# Patient Record
Sex: FEMALE | Race: WHITE | NOT HISPANIC OR LATINO | Employment: UNEMPLOYED | URBAN - METROPOLITAN AREA
[De-identification: names, ages, dates, MRNs, and addresses within clinical notes are randomized per-mention and may not be internally consistent; named-entity substitution may affect disease eponyms.]

---

## 2020-07-28 ENCOUNTER — TRANSCRIBE ORDERS (OUTPATIENT)
Dept: ADMINISTRATIVE | Facility: HOSPITAL | Age: 35
End: 2020-07-28

## 2020-07-28 DIAGNOSIS — I25.89 ISCHEMIC HEART DISEASE WITH CHRONOTROPIC INCOMPETENCE: Primary | ICD-10-CM

## 2020-07-28 DIAGNOSIS — I25.10 CHRONIC CORONARY ARTERY DISEASE: ICD-10-CM

## 2020-08-30 ENCOUNTER — HOSPITAL ENCOUNTER (OUTPATIENT)
Dept: CARDIOLOGY | Facility: HOSPITAL | Age: 35
Discharge: HOME | End: 2020-08-30
Admitting: ORTHOPAEDIC SURGERY

## 2020-08-30 VITALS — BODY MASS INDEX: 29.99 KG/M2 | HEIGHT: 65 IN | WEIGHT: 180 LBS

## 2020-08-30 DIAGNOSIS — I25.10 CHRONIC CORONARY ARTERY DISEASE: ICD-10-CM

## 2020-08-30 LAB
BH CV ECHO MEAS - AO MAX PG (FULL): 4.1 MMHG
BH CV ECHO MEAS - AO MAX PG: 9.6 MMHG
BH CV ECHO MEAS - AO MEAN PG (FULL): 1.7 MMHG
BH CV ECHO MEAS - AO MEAN PG: 5.1 MMHG
BH CV ECHO MEAS - AO ROOT AREA (BSA CORRECTED): 1.3
BH CV ECHO MEAS - AO ROOT AREA: 5 CM^2
BH CV ECHO MEAS - AO ROOT DIAM: 2.5 CM
BH CV ECHO MEAS - AO V2 MAX: 155.3 CM/SEC
BH CV ECHO MEAS - AO V2 MEAN: 103.3 CM/SEC
BH CV ECHO MEAS - AO V2 VTI: 35.3 CM
BH CV ECHO MEAS - AVA(I,A): 2.3 CM^2
BH CV ECHO MEAS - AVA(I,D): 2.3 CM^2
BH CV ECHO MEAS - AVA(V,A): 2.3 CM^2
BH CV ECHO MEAS - AVA(V,D): 2.3 CM^2
BH CV ECHO MEAS - BSA(HAYCOCK): 2 M^2
BH CV ECHO MEAS - BSA: 1.9 M^2
BH CV ECHO MEAS - BZI_BMI: 30 KILOGRAMS/M^2
BH CV ECHO MEAS - BZI_METRIC_HEIGHT: 165.1 CM
BH CV ECHO MEAS - BZI_METRIC_WEIGHT: 81.6 KG
BH CV ECHO MEAS - EDV(CUBED): 66.4 ML
BH CV ECHO MEAS - EDV(MOD-SP2): 90 ML
BH CV ECHO MEAS - EDV(MOD-SP4): 105 ML
BH CV ECHO MEAS - EDV(TEICH): 72 ML
BH CV ECHO MEAS - EF(CUBED): 67 %
BH CV ECHO MEAS - EF(MOD-BP): 62 %
BH CV ECHO MEAS - EF(MOD-SP2): 65.6 %
BH CV ECHO MEAS - EF(MOD-SP4): 64.8 %
BH CV ECHO MEAS - EF(TEICH): 59.1 %
BH CV ECHO MEAS - ESV(CUBED): 21.9 ML
BH CV ECHO MEAS - ESV(MOD-SP2): 31 ML
BH CV ECHO MEAS - ESV(MOD-SP4): 37 ML
BH CV ECHO MEAS - ESV(TEICH): 29.5 ML
BH CV ECHO MEAS - FS: 30.9 %
BH CV ECHO MEAS - IVS/LVPW: 0.59
BH CV ECHO MEAS - IVSD: 0.72 CM
BH CV ECHO MEAS - LA DIMENSION: 3.6 CM
BH CV ECHO MEAS - LA/AO: 1.4
BH CV ECHO MEAS - LAD MAJOR: 5.4 CM
BH CV ECHO MEAS - LAT PEAK E' VEL: 18 CM/SEC
BH CV ECHO MEAS - LATERAL E/E' RATIO: 5.7
BH CV ECHO MEAS - LV DIASTOLIC VOL/BSA (35-75): 55.5 ML/M^2
BH CV ECHO MEAS - LV MASS(C)D: 125.1 GRAMS
BH CV ECHO MEAS - LV MASS(C)DI: 66.1 GRAMS/M^2
BH CV ECHO MEAS - LV MAX PG: 5.6 MMHG
BH CV ECHO MEAS - LV MEAN PG: 3.4 MMHG
BH CV ECHO MEAS - LV SYSTOLIC VOL/BSA (12-30): 19.6 ML/M^2
BH CV ECHO MEAS - LV V1 MAX: 118 CM/SEC
BH CV ECHO MEAS - LV V1 MEAN: 87 CM/SEC
BH CV ECHO MEAS - LV V1 VTI: 26.7 CM
BH CV ECHO MEAS - LVIDD: 4 CM
BH CV ECHO MEAS - LVIDS: 2.8 CM
BH CV ECHO MEAS - LVLD AP2: 8 CM
BH CV ECHO MEAS - LVLD AP4: 8.3 CM
BH CV ECHO MEAS - LVLS AP2: 5.6 CM
BH CV ECHO MEAS - LVLS AP4: 6.6 CM
BH CV ECHO MEAS - LVOT AREA (M): 3.1 CM^2
BH CV ECHO MEAS - LVOT AREA: 3 CM^2
BH CV ECHO MEAS - LVOT DIAM: 2 CM
BH CV ECHO MEAS - LVPWD: 1.2 CM
BH CV ECHO MEAS - MED PEAK E' VEL: 12 CM/SEC
BH CV ECHO MEAS - MEDIAL E/E' RATIO: 8.6
BH CV ECHO MEAS - MV A MAX VEL: 77 CM/SEC
BH CV ECHO MEAS - MV DEC TIME: 0.19 SEC
BH CV ECHO MEAS - MV E MAX VEL: 104.6 CM/SEC
BH CV ECHO MEAS - MV E/A: 1.4
BH CV ECHO MEAS - MV MAX PG: 4.6 MMHG
BH CV ECHO MEAS - MV MEAN PG: 2.3 MMHG
BH CV ECHO MEAS - MV V2 MAX: 106.8 CM/SEC
BH CV ECHO MEAS - MV V2 MEAN: 70.9 CM/SEC
BH CV ECHO MEAS - MV V2 VTI: 29.2 CM
BH CV ECHO MEAS - MVA(VTI): 2.7 CM^2
BH CV ECHO MEAS - PA ACC SLOPE: 981.3 CM/SEC^2
BH CV ECHO MEAS - PA ACC TIME: 0.1 SEC
BH CV ECHO MEAS - PA PR(ACCEL): 36.2 MMHG
BH CV ECHO MEAS - RAP SYSTOLE: 3 MMHG
BH CV ECHO MEAS - RVSP: 19 MMHG
BH CV ECHO MEAS - SI(AO): 94 ML/M^2
BH CV ECHO MEAS - SI(CUBED): 23.5 ML/M^2
BH CV ECHO MEAS - SI(LVOT): 42.4 ML/M^2
BH CV ECHO MEAS - SI(MOD-SP2): 31.2 ML/M^2
BH CV ECHO MEAS - SI(MOD-SP4): 36 ML/M^2
BH CV ECHO MEAS - SI(TEICH): 22.5 ML/M^2
BH CV ECHO MEAS - SV(AO): 177.8 ML
BH CV ECHO MEAS - SV(CUBED): 44.5 ML
BH CV ECHO MEAS - SV(LVOT): 80.2 ML
BH CV ECHO MEAS - SV(MOD-SP2): 59 ML
BH CV ECHO MEAS - SV(MOD-SP4): 68 ML
BH CV ECHO MEAS - SV(TEICH): 42.5 ML
BH CV ECHO MEAS - TAPSE (>1.6): 2.3 CM2
BH CV ECHO MEAS - TR MAX PG: 16 MMHG
BH CV ECHO MEAS - TR MAX VEL: 200.9 CM/SEC
BH CV ECHO MEASUREMENTS AVERAGE E/E' RATIO: 6.97
BH CV VAS BP LEFT ARM: NORMAL MMHG
BH CV XLRA - RV BASE: 2.9 CM
BH CV XLRA - RV LENGTH: 6.3 CM
BH CV XLRA - RV MID: 1.9 CM
BH CV XLRA - TDI S': 12.8 CM/SEC
LEFT ATRIUM VOLUME INDEX: 18.5 ML/M^2
LEFT ATRIUM VOLUME: 35 ML
LV EF 2D ECHO EST: 65 %
MAXIMAL PREDICTED HEART RATE: 187 BPM
STRESS TARGET HR: 159 BPM

## 2020-08-30 PROCEDURE — 93306 TTE W/DOPPLER COMPLETE: CPT | Performed by: INTERNAL MEDICINE

## 2020-08-30 PROCEDURE — 93306 TTE W/DOPPLER COMPLETE: CPT

## 2021-03-18 ENCOUNTER — HOSPITAL ENCOUNTER (EMERGENCY)
Facility: HOSPITAL | Age: 36
Discharge: HOME | End: 2021-03-18
Attending: EMERGENCY MEDICINE | Admitting: EMERGENCY MEDICINE

## 2021-03-18 ENCOUNTER — APPOINTMENT (OUTPATIENT)
Dept: ULTRASOUND IMAGING | Facility: HOSPITAL | Age: 36
End: 2021-03-18

## 2021-03-18 VITALS
WEIGHT: 174.2 LBS | HEART RATE: 76 BPM | TEMPERATURE: 98.3 F | OXYGEN SATURATION: 99 % | DIASTOLIC BLOOD PRESSURE: 60 MMHG | BODY MASS INDEX: 29.02 KG/M2 | SYSTOLIC BLOOD PRESSURE: 122 MMHG | RESPIRATION RATE: 16 BRPM | HEIGHT: 65 IN

## 2021-03-18 DIAGNOSIS — R10.32 LLQ ABDOMINAL PAIN: ICD-10-CM

## 2021-03-18 DIAGNOSIS — Z34.91 FIRST TRIMESTER PREGNANCY: Primary | ICD-10-CM

## 2021-03-18 LAB
ABO GROUP BLD: NORMAL
ALBUMIN SERPL-MCNC: 3.9 G/DL (ref 3.5–5.2)
ALBUMIN/GLOB SERPL: 2 G/DL
ALP SERPL-CCNC: 59 U/L (ref 39–117)
ALT SERPL W P-5'-P-CCNC: 14 U/L (ref 1–33)
ANION GAP SERPL CALCULATED.3IONS-SCNC: 7.9 MMOL/L (ref 5–15)
AST SERPL-CCNC: 11 U/L (ref 1–32)
BASOPHILS # BLD AUTO: 0.07 10*3/MM3 (ref 0–0.2)
BASOPHILS NFR BLD AUTO: 0.8 % (ref 0–1.5)
BILIRUB SERPL-MCNC: 0.3 MG/DL (ref 0–1.2)
BUN SERPL-MCNC: 8 MG/DL (ref 6–20)
BUN/CREAT SERPL: 11.1 (ref 7–25)
CALCIUM SPEC-SCNC: 9.1 MG/DL (ref 8.6–10.5)
CHLORIDE SERPL-SCNC: 105 MMOL/L (ref 98–107)
CLUE CELLS SPEC QL WET PREP: ABNORMAL
CO2 SERPL-SCNC: 23.1 MMOL/L (ref 22–29)
CREAT SERPL-MCNC: 0.72 MG/DL (ref 0.57–1)
DEPRECATED RDW RBC AUTO: 43.2 FL (ref 37–54)
EOSINOPHIL # BLD AUTO: 0.21 10*3/MM3 (ref 0–0.4)
EOSINOPHIL NFR BLD AUTO: 2.5 % (ref 0.3–6.2)
ERYTHROCYTE [DISTWIDTH] IN BLOOD BY AUTOMATED COUNT: 13 % (ref 12.3–15.4)
GFR SERPL CREATININE-BSD FRML MDRD: 113 ML/MIN/1.73
GFR SERPL CREATININE-BSD FRML MDRD: 93 ML/MIN/1.73
GLOBULIN UR ELPH-MCNC: 2 GM/DL
GLUCOSE SERPL-MCNC: 80 MG/DL (ref 65–99)
HCG INTACT+B SERPL-ACNC: NORMAL MIU/ML
HCT VFR BLD AUTO: 42.6 % (ref 34–46.6)
HGB BLD-MCNC: 14.1 G/DL (ref 12–15.9)
HYDATID CYST SPEC WET PREP: ABNORMAL
IMM GRANULOCYTES # BLD AUTO: 0.03 10*3/MM3 (ref 0–0.05)
IMM GRANULOCYTES NFR BLD AUTO: 0.4 % (ref 0–0.5)
KOH PREP NAIL: NORMAL
LYMPHOCYTES # BLD AUTO: 2.24 10*3/MM3 (ref 0.7–3.1)
LYMPHOCYTES NFR BLD AUTO: 26.4 % (ref 19.6–45.3)
MCH RBC QN AUTO: 30 PG (ref 26.6–33)
MCHC RBC AUTO-ENTMCNC: 33.1 G/DL (ref 31.5–35.7)
MCV RBC AUTO: 90.6 FL (ref 79–97)
MONOCYTES # BLD AUTO: 0.48 10*3/MM3 (ref 0.1–0.9)
MONOCYTES NFR BLD AUTO: 5.6 % (ref 5–12)
NEUTROPHILS NFR BLD AUTO: 5.47 10*3/MM3 (ref 1.7–7)
NEUTROPHILS NFR BLD AUTO: 64.3 % (ref 42.7–76)
NRBC BLD AUTO-RTO: 0 /100 WBC (ref 0–0.2)
NUMBER OF DOSES: NORMAL
PLATELET # BLD AUTO: 295 10*3/MM3 (ref 140–450)
PMV BLD AUTO: 9.9 FL (ref 6–12)
POTASSIUM SERPL-SCNC: 4.3 MMOL/L (ref 3.5–5.2)
PROT SERPL-MCNC: 5.9 G/DL (ref 6–8.5)
RBC # BLD AUTO: 4.7 10*6/MM3 (ref 3.77–5.28)
RH BLD: POSITIVE
SODIUM SERPL-SCNC: 136 MMOL/L (ref 136–145)
T VAGINALIS SPEC QL WET PREP: ABNORMAL
WBC # BLD AUTO: 8.5 10*3/MM3 (ref 3.4–10.8)
WBC SPEC QL WET PREP: ABNORMAL
YEAST GENITAL QL WET PREP: ABNORMAL

## 2021-03-18 PROCEDURE — 87491 CHLMYD TRACH DNA AMP PROBE: CPT | Performed by: PHYSICIAN ASSISTANT

## 2021-03-18 PROCEDURE — 84702 CHORIONIC GONADOTROPIN TEST: CPT | Performed by: PHYSICIAN ASSISTANT

## 2021-03-18 PROCEDURE — 87220 TISSUE EXAM FOR FUNGI: CPT | Performed by: PHYSICIAN ASSISTANT

## 2021-03-18 PROCEDURE — 80053 COMPREHEN METABOLIC PANEL: CPT | Performed by: PHYSICIAN ASSISTANT

## 2021-03-18 PROCEDURE — 87210 SMEAR WET MOUNT SALINE/INK: CPT | Performed by: PHYSICIAN ASSISTANT

## 2021-03-18 PROCEDURE — 76817 TRANSVAGINAL US OBSTETRIC: CPT

## 2021-03-18 PROCEDURE — 99284 EMERGENCY DEPT VISIT MOD MDM: CPT

## 2021-03-18 PROCEDURE — 87591 N.GONORRHOEAE DNA AMP PROB: CPT | Performed by: PHYSICIAN ASSISTANT

## 2021-03-18 PROCEDURE — 85025 COMPLETE CBC W/AUTO DIFF WBC: CPT | Performed by: PHYSICIAN ASSISTANT

## 2021-03-18 PROCEDURE — 86900 BLOOD TYPING SEROLOGIC ABO: CPT | Performed by: PHYSICIAN ASSISTANT

## 2021-03-18 PROCEDURE — 99283 EMERGENCY DEPT VISIT LOW MDM: CPT

## 2021-03-18 PROCEDURE — 86901 BLOOD TYPING SEROLOGIC RH(D): CPT | Performed by: PHYSICIAN ASSISTANT

## 2021-03-18 RX ORDER — HYDROCODONE BITARTRATE AND ACETAMINOPHEN 5; 325 MG/1; MG/1
1 TABLET ORAL ONCE
Status: COMPLETED | OUTPATIENT
Start: 2021-03-18 | End: 2021-03-18

## 2021-03-18 RX ADMIN — HYDROCODONE BITARTRATE AND ACETAMINOPHEN 1 TABLET: 5; 325 TABLET ORAL at 14:21

## 2021-03-18 NOTE — ED PROVIDER NOTES
" EMERGENCY DEPARTMENT ENCOUNTER    Pt Name: Francesca Mays  MRN: [id number]  Pt :   1985  Room Number:    Date of encounter:  3/18/2021  PCP: Provider, No Known  ED Provider: Wenceslao Musa PA-C    Historian: Patient      HPI:  Chief Complaint: Left lower quadrant abdominal pain, positive home pregnancy test.        Context: Francesca Mays is a 35 y.o. female who presents to the ED c/o 2 and half week history of left lower quadrant abdominal pain and positive home pregnancy test.  [facility] but no [facility] was identified.  Patient was then referred to the emergency department due to worsening left lower quadrant pain and no IUP identified on ultrasound.  Patient admits to a \"sweet\" smelling creamy vaginal discharge, no vaginal bleeding.  No prior history of STD.  She is in a monogamous relationship.  She is G5, .  Her previous 2 miscarriages were around 12 weeks.  [facility].  She denies fever chills sweats dysuria hematuria pyuria.  No nausea vomiting constipation diarrhea melena or hematochezia.      PAST MEDICAL HISTORY  Active Ambulatory Problems     Diagnosis Date Noted   • No Active Ambulatory Problems     Resolved Ambulatory Problems     Diagnosis Date Noted   • No Resolved Ambulatory Problems     Past Medical History:   Diagnosis Date   • Disease of thyroid gland    • Migraines    • MTHFR deficiency complicating pregnancy (CMS/HCC)    • PAT (paroxysmal atrial tachycardia) (CMS/HCC)          PAST SURGICAL HISTORY  Past Surgical History:   Procedure Laterality Date   •  SECTION     • DIAGNOSTIC LAPAROSCOPY     • KNEE SURGERY Right    • SHOULDER SURGERY Right    • TONSILLECTOMY           FAMILY HISTORY  History reviewed. No pertinent family history.      SOCIAL HISTORY  Social History     Socioeconomic History   • Marital status:      Spouse name: Not on file   • Number of children: Not on file   • " Years of education: Not on file   • Highest education level: Not on file   Tobacco Use   • Smoking status: Current Every Day Smoker     Packs/day: 0.50     Types: Cigarettes   • Smokeless tobacco: Never Used   Vaping Use   • Vaping Use: Never used   Substance and Sexual Activity   • Alcohol use: Not Currently     Comment: rare   • Drug use: Defer   • Sexual activity: Never         ALLERGIES  Elavil [amitriptyline], Percocet [oxycodone-acetaminophen], Toradol [ketorolac tromethamine], and Tramadol        REVIEW OF SYSTEMS  Review of Systems   Constitutional: Negative for activity change, appetite change, chills, fatigue and fever.   HENT: Negative for congestion, rhinorrhea and sore throat.    Eyes: Negative for photophobia and visual disturbance.   Respiratory: Negative for cough, shortness of breath and wheezing.    Cardiovascular: Negative for chest pain, palpitations and leg swelling.   Gastrointestinal: Positive for abdominal pain (Left lower quadrant abdominal pain). Negative for diarrhea, nausea and vomiting.   Genitourinary: Positive for vaginal discharge. Negative for dysuria, flank pain, hematuria, pelvic pain, vaginal bleeding and vaginal pain.   Musculoskeletal: Negative for back pain, myalgias and neck pain.   Neurological: Negative for seizures, syncope and headaches.   All other systems reviewed and are negative.       All systems reviewed and negative except for those discussed in HPI.       PHYSICAL EXAM    I have reviewed the triage vital signs and nursing notes.    ED Triage Vitals [03/18/21 1145]   Temp Heart Rate Resp BP SpO2   98.3 °F (36.8 °C) 83 16 128/55 99 %      Temp src Heart Rate Source Patient Position BP Location FiO2 (%)   Oral Monitor Sitting Right arm --       Physical Exam  GENERAL:   Appears to be in a significant amount of discomfort.  Hemodynamically stable afebrile not toxic appearing.  HENT: Nares patent.  EYES: No scleral icterus.  CV: Regular rhythm, regular  rate.  RESPIRATORY: Normal effort.  No audible wheezes, rales or rhonchi.  ABDOMEN: Soft, mild tenderness to left lower quadrant with no guarding or rebound tenderness.  Bowel sounds are normal.  No palpable organomegaly or pulsatile masses.  No CVA or suprapubic tenderness..  MUSCULOSKELETAL: No deformities.   NEURO: Alert, moves all extremities, follows commands.  SKIN: Warm, dry, no rash visualized.        LAB RESULTS  Recent Results (from the past 24 hour(s))   Comprehensive Metabolic Panel    Collection Time: 21 12:49 PM    Specimen: Blood   Result Value Ref Range    Glucose 80 65 - 99 mg/dL    BUN 8 6 - 20 mg/dL    Creatinine 0.72 0.57 - 1.00 mg/dL    Sodium 136 136 - 145 mmol/L    Potassium 4.3 3.5 - 5.2 mmol/L    Chloride 105 98 - 107 mmol/L    CO2 23.1 22.0 - 29.0 mmol/L    Calcium 9.1 8.6 - 10.5 mg/dL    Total Protein 5.9 (L) 6.0 - 8.5 g/dL    Albumin 3.90 3.50 - 5.20 g/dL    ALT (SGPT) 14 1 - 33 U/L    AST (SGOT) 11 1 - 32 U/L    Alkaline Phosphatase 59 39 - 117 U/L    Total Bilirubin 0.3 0.0 - 1.2 mg/dL    eGFR Non African Amer 93 >60 mL/min/1.73    eGFR  African Amer 113 >60 mL/min/1.73    Globulin 2.0 gm/dL    A/G Ratio 2.0 g/dL    BUN/Creatinine Ratio 11.1 7.0 - 25.0    Anion Gap 7.9 5.0 - 15.0 mmol/L   hCG, Quantitative, Pregnancy    Collection Time: 21 12:49 PM    Specimen: Blood   Result Value Ref Range    HCG Quantitative 103,354.00 mIU/mL    RhIg Evaluation    Collection Time: 21 12:49 PM    Specimen: Blood   Result Value Ref Range    ABO Type A     RH type Positive    Doses of Rh Immune Globulin    Collection Time: 21 12:49 PM    Specimen: Blood   Result Value Ref Range    Number of Doses       RhIg is not indicated due to the patient's Rh status   CBC Auto Differential    Collection Time: 21 12:49 PM    Specimen: Blood   Result Value Ref Range    WBC 8.50 3.40 - 10.80 10*3/mm3    RBC 4.70 3.77 - 5.28 10*6/mm3    Hemoglobin 14.1 12.0 - 15.9 g/dL     Hematocrit 42.6 34.0 - 46.6 %    MCV 90.6 79.0 - 97.0 fL    MCH 30.0 26.6 - 33.0 pg    MCHC 33.1 31.5 - 35.7 g/dL    RDW 13.0 12.3 - 15.4 %    RDW-SD 43.2 37.0 - 54.0 fl    MPV 9.9 6.0 - 12.0 fL    Platelets 295 140 - 450 10*3/mm3    Neutrophil % 64.3 42.7 - 76.0 %    Lymphocyte % 26.4 19.6 - 45.3 %    Monocyte % 5.6 5.0 - 12.0 %    Eosinophil % 2.5 0.3 - 6.2 %    Basophil % 0.8 0.0 - 1.5 %    Immature Grans % 0.4 0.0 - 0.5 %    Neutrophils, Absolute 5.47 1.70 - 7.00 10*3/mm3    Lymphocytes, Absolute 2.24 0.70 - 3.10 10*3/mm3    Monocytes, Absolute 0.48 0.10 - 0.90 10*3/mm3    Eosinophils, Absolute 0.21 0.00 - 0.40 10*3/mm3    Basophils, Absolute 0.07 0.00 - 0.20 10*3/mm3    Immature Grans, Absolute 0.03 0.00 - 0.05 10*3/mm3    nRBC 0.0 0.0 - 0.2 /100 WBC   PRAVEEN Prep - Swab, Vagina    Collection Time: 21  3:00 PM    Specimen: Vagina; Swab   Result Value Ref Range    KOH Prep No yeast or hyphal elements seen No yeast or hyphal elements seen   Wet Prep, Genital - Swab, Vagina    Collection Time: 21  3:00 PM    Specimen: Vagina; Swab   Result Value Ref Range    YEAST No yeast seen No yeast seen    HYPHAL ELEMENTS No Hyphal elements seen No Hyphal elements seen    WBC'S 1+ WBC's seen (A) No WBC's seen    Clue Cells, Wet Prep No Clue cells seen No Clue cells seen    Trichomonas, Wet Prep No Trichomonas seen No Trichomonas seen       If labs were ordered, I independently reviewed the results.        RADIOLOGY  US Ob Transvaginal    Result Date: 3/18/2021  PROCEDURE: US OB TRANSVAGINAL-  HISTORY: LLQ abd pain, ~ 8 wks.  A2  TECHNIQUE: Sonographic images of the pelvis were obtained transvaginally.  FINDINGS: A a single fetus is present within the uterus corresponding to an eight week five day gestation. Fetal heart tones are present at 170 bpm. There is no evidence of a significant  subchorionic hemorrhage. The ovaries are unremarkable and demonstrate appropriate blood flow. No adnexal masses seen. There is a  small amount free fluid in the pelvis.      Impression: Living intrauterine pregnancy.  This report was finalized on 3/18/2021 1:19 PM by Opal Raymond M.D..      .        PROCEDURES    Procedures    No orders to display       MEDICATIONS GIVEN IN ER    Medications   HYDROcodone-acetaminophen (NORCO) 5-325 MG per tablet 1 tablet (1 tablet Oral Given 3/18/21 1421)         PROGRESS, DATA ANALYSIS, CONSULTS, AND MEDICAL DECISION MAKING    All labs have been independently reviewed by me.  All radiology studies have been reviewed by me and the radiologist dictating the report.   EKG's have been independently viewed and interpreted by me.            ED Course as of Mar 18 2120   Thu Mar 18, 2021   1426 HCG Quantitative: 103,354.00 [TG]   1426 Number of Doses: RhIg is not indicated due to the patient's Rh status [TG]      ED Course User Index  [TG] Wenceslao Musa PA-C       Patient remained stable throughout course of stay in emergency department.  Toward the end of the visit patient admitted that she was told by her OB/GYN previously that she should not have another baby as with her preexistent heart disease, it could prove fatal for her as well as the baby.  Patient was tearful when discussing this with her  over the telephone and she asked about what options she had.  Advised him that he can contact [facility] that can  him further on any decisions they may have.  Advised patient to have low threshold to return if she has any change or worsening of her symptom she verbalized understanding and is agreeable to plan.      AS OF 21:20 EDT VITALS:    BP - 122/60  HR - 76  TEMP - 98.3 °F (36.8 °C) (Oral)  O2 SATS - 99%        DIAGNOSIS  Final diagnoses:   First trimester pregnancy   LLQ abdominal pain         DISPOSITION  DISCHARGE    Patient discharged in stable condition.    Reviewed implications of results, diagnosis, meds, responsibility to follow up, warning signs and  symptoms of possible worsening, potential complications and reasons to return to ER.    Patient/Family voiced understanding of above instructions.    Discussed plan for discharge, as there is no emergent indication for admission.  Pt/family is agreeable and understands need for follow up and possible repeat testing.  Pt/family is aware that discharge does not mean that nothing is wrong but that it indicates no emergency is currently present that requires admission and they must continue care with follow-up as given below or with a physician of their choice.     FOLLOW-UP  PATIENT [facility] [address] [phone]             Medication List      No changes were made to your prescriptions during this visit.                  Wenceslao Musa PA-C  03/18/21 6598

## 2021-03-18 NOTE — DISCHARGE INSTRUCTIONS
Rest, warm compresses to lower abdomen, Tylenol as directed for pain.  Follow-up with the patient connection number listed above to establish a primary care provider if desired, [facility] your [facility] in 2 to 3 days.  Follow-up with [facility] jennifer Lobo at [phone] to discuss your options.  Return to the emergency department immediately if any change or worsening of symptoms.

## 2021-03-19 LAB
C TRACH RRNA SPEC QL NAA+PROBE: NEGATIVE
N GONORRHOEA RRNA SPEC QL NAA+PROBE: NEGATIVE

## 2021-06-01 ENCOUNTER — HOSPITAL ENCOUNTER (EMERGENCY)
Facility: HOSPITAL | Age: 36
Discharge: HOME | End: 2021-06-01
Attending: EMERGENCY MEDICINE | Admitting: EMERGENCY MEDICINE

## 2021-06-01 ENCOUNTER — APPOINTMENT (OUTPATIENT)
Dept: ULTRASOUND IMAGING | Facility: HOSPITAL | Age: 36
End: 2021-06-01

## 2021-06-01 VITALS
HEART RATE: 98 BPM | BODY MASS INDEX: 29.57 KG/M2 | RESPIRATION RATE: 16 BRPM | WEIGHT: 184 LBS | TEMPERATURE: 98.5 F | DIASTOLIC BLOOD PRESSURE: 62 MMHG | HEIGHT: 66 IN | SYSTOLIC BLOOD PRESSURE: 130 MMHG | OXYGEN SATURATION: 99 %

## 2021-06-01 DIAGNOSIS — L03.116 LEFT LEG CELLULITIS: Primary | ICD-10-CM

## 2021-06-01 PROCEDURE — 99282 EMERGENCY DEPT VISIT SF MDM: CPT

## 2021-06-01 PROCEDURE — 93971 EXTREMITY STUDY: CPT

## 2021-06-01 RX ORDER — CEPHALEXIN 500 MG/1
500 CAPSULE ORAL 3 TIMES DAILY
Qty: 21 CAPSULE | Refills: 0 | Status: SHIPPED | OUTPATIENT
Start: 2021-06-01 | End: 2021-06-08

## 2021-06-02 NOTE — ED PROVIDER NOTES
Subjective   Chief Complaint: L erythema to the left medial ankle/lower leg  History of Present Illness: 36-year-old female comes in for evaluation of above complaint.  She has a history of MTHFR.  She is currently 20 weeks pregnant.  She states her left lower extremity is chronically swollen and no different than baseline.  She states yesterday noted some erythema and warmth to the medial left lower leg.  2+ DP pulse on the left.  She states she only has pain when she bears weight.  No known injury.  History of DVT.  Requesting an ultrasound.  No chest pain or shortness of breath.  No abdominal pain or vaginal bleeding.  Onset: Yesterday redness  Timing: Constant ongoing  Exacerbating / Alleviating factors: Worse with bearing weight  Associated symptoms: None      Nurses Notes reviewed and agree, including vitals, allergies, social history and prior medical history.          Review of Systems   Constitutional: Negative.    HENT: Negative.    Eyes: Negative.    Respiratory: Negative.    Cardiovascular: Negative.    Gastrointestinal: Negative.    Genitourinary: Negative.    Musculoskeletal: Negative.    Skin:        Erythema to left medial lower leg   Neurological: Negative.    Psychiatric/Behavioral: Negative.        Past Medical History:   Diagnosis Date   • Disease of thyroid gland    • Migraines    • MTHFR deficiency complicating pregnancy (CMS/HCC)    • PAT (paroxysmal atrial tachycardia) (CMS/HCC)        Allergies   Allergen Reactions   • Elavil [Amitriptyline] Anxiety   • Percocet [Oxycodone-Acetaminophen] Hives   • Toradol [Ketorolac Tromethamine] Rash   • Tramadol Rash       Past Surgical History:   Procedure Laterality Date   •  SECTION     • DIAGNOSTIC LAPAROSCOPY     • KNEE SURGERY Right    • SHOULDER SURGERY Right    • TONSILLECTOMY         History reviewed. No pertinent family history.    Social History     Socioeconomic History   • Marital status:      Spouse name: Not on file   • Number  of children: Not on file   • Years of education: Not on file   • Highest education level: Not on file   Tobacco Use   • Smoking status: Current Every Day Smoker     Packs/day: 0.50     Types: Cigarettes   • Smokeless tobacco: Never Used   Vaping Use   • Vaping Use: Never used   Substance and Sexual Activity   • Alcohol use: Not Currently     Comment: rare   • Drug use: Defer   • Sexual activity: Never           Objective   Physical Exam  Vitals and nursing note reviewed.   Constitutional:       General: She is not in acute distress.     Appearance: Normal appearance. She is not ill-appearing, toxic-appearing or diaphoretic.   HENT:      Head: Normocephalic and atraumatic.      Nose: Nose normal.   Eyes:      Extraocular Movements: Extraocular movements intact.   Cardiovascular:      Rate and Rhythm: Normal rate and regular rhythm.      Pulses: Normal pulses.   Pulmonary:      Effort: Pulmonary effort is normal.   Musculoskeletal:         General: Normal range of motion.      Cervical back: Normal range of motion.   Skin:     General: Skin is warm.             Comments: Area of erythema to the left medial lower leg.  No abscess   Neurological:      General: No focal deficit present.      Mental Status: She is alert.   Psychiatric:         Mood and Affect: Mood normal.         Behavior: Behavior normal.         Procedures           ED Course                                           [facility]  2139  No signs of DVT on ultrasound.  Will treat for cellulitis with Keflex and have follow-up with PCP within a week  Final diagnoses:   Left leg cellulitis       ED Disposition  ED Disposition     ED Disposition Condition Comment    Discharge Stable           No follow-up provider specified.       Medication List      New Prescriptions    cephalexin 500 MG capsule  Commonly known as: KEFLEX  Take 1 capsule by mouth 3 (Three) Times a Day for 7 days.           Where to Get Your Medications      These medications were sent to [facility] [address] [place] [address] - [address] [facility] - [phone]  - [phone]   [address] [facility] [address]    Phone: [phone]   · cephalexin 500 MG capsule          Elmer German PA-C  06/01/21 3516

## 2021-09-24 ENCOUNTER — TRANSCRIBE ORDERS (OUTPATIENT)
Dept: ADMINISTRATIVE | Facility: HOSPITAL | Age: 36
End: 2021-09-24

## 2021-09-24 DIAGNOSIS — I87.2 VENOUS INSUFFICIENCY: Primary | ICD-10-CM

## 2022-03-27 ENCOUNTER — APPOINTMENT (OUTPATIENT)
Dept: ULTRASOUND IMAGING | Facility: HOSPITAL | Age: 37
End: 2022-03-27

## 2022-03-27 ENCOUNTER — HOSPITAL ENCOUNTER (EMERGENCY)
Facility: HOSPITAL | Age: 37
Discharge: ANOTHER HOSPITAL | End: 2022-03-28
Attending: EMERGENCY MEDICINE | Admitting: EMERGENCY MEDICINE

## 2022-03-27 ENCOUNTER — APPOINTMENT (OUTPATIENT)
Dept: CT IMAGING | Facility: HOSPITAL | Age: 37
End: 2022-03-27

## 2022-03-27 DIAGNOSIS — N39.0 URINARY TRACT INFECTION WITHOUT HEMATURIA, SITE UNSPECIFIED: ICD-10-CM

## 2022-03-27 DIAGNOSIS — N20.1 LEFT URETERAL STONE: Primary | ICD-10-CM

## 2022-03-27 LAB
ALBUMIN SERPL-MCNC: 4.2 G/DL (ref 3.5–5.2)
ALBUMIN/GLOB SERPL: 1.6 G/DL
ALP SERPL-CCNC: 65 U/L (ref 39–117)
ALT SERPL W P-5'-P-CCNC: 16 U/L (ref 1–33)
ANION GAP SERPL CALCULATED.3IONS-SCNC: 13.7 MMOL/L (ref 5–15)
AST SERPL-CCNC: 10 U/L (ref 1–32)
BACTERIA UR QL AUTO: ABNORMAL /HPF
BASOPHILS # BLD AUTO: 0.04 10*3/MM3 (ref 0–0.2)
BASOPHILS NFR BLD AUTO: 0.3 % (ref 0–1.5)
BILIRUB SERPL-MCNC: 0.5 MG/DL (ref 0–1.2)
BILIRUB UR QL STRIP: NEGATIVE
BUN SERPL-MCNC: 12 MG/DL (ref 6–20)
BUN/CREAT SERPL: 10 (ref 7–25)
CALCIUM SPEC-SCNC: 8.7 MG/DL (ref 8.6–10.5)
CHLORIDE SERPL-SCNC: 100 MMOL/L (ref 98–107)
CLARITY UR: ABNORMAL
CO2 SERPL-SCNC: 18.3 MMOL/L (ref 22–29)
COLOR UR: YELLOW
CREAT SERPL-MCNC: 1.2 MG/DL (ref 0.57–1)
D-LACTATE SERPL-SCNC: 1.5 MMOL/L (ref 0.5–2)
DEPRECATED RDW RBC AUTO: 42.4 FL (ref 37–54)
EGFRCR SERPLBLD CKD-EPI 2021: 61 ML/MIN/1.73
EOSINOPHIL # BLD AUTO: 0.01 10*3/MM3 (ref 0–0.4)
EOSINOPHIL NFR BLD AUTO: 0.1 % (ref 0.3–6.2)
ERYTHROCYTE [DISTWIDTH] IN BLOOD BY AUTOMATED COUNT: 13.6 % (ref 12.3–15.4)
GLOBULIN UR ELPH-MCNC: 2.6 GM/DL
GLUCOSE SERPL-MCNC: 128 MG/DL (ref 65–99)
GLUCOSE UR STRIP-MCNC: NEGATIVE MG/DL
HCG SERPL QL: POSITIVE
HCT VFR BLD AUTO: 39.3 % (ref 34–46.6)
HGB BLD-MCNC: 13.7 G/DL (ref 12–15.9)
HGB UR QL STRIP.AUTO: ABNORMAL
HYALINE CASTS UR QL AUTO: ABNORMAL /LPF
IMM GRANULOCYTES # BLD AUTO: 0.09 10*3/MM3 (ref 0–0.05)
IMM GRANULOCYTES NFR BLD AUTO: 0.6 % (ref 0–0.5)
KETONES UR QL STRIP: NEGATIVE
LEUKOCYTE ESTERASE UR QL STRIP.AUTO: ABNORMAL
LIPASE SERPL-CCNC: 15 U/L (ref 13–60)
LYMPHOCYTES # BLD AUTO: 1.11 10*3/MM3 (ref 0.7–3.1)
LYMPHOCYTES NFR BLD AUTO: 7 % (ref 19.6–45.3)
MCH RBC QN AUTO: 30 PG (ref 26.6–33)
MCHC RBC AUTO-ENTMCNC: 34.9 G/DL (ref 31.5–35.7)
MCV RBC AUTO: 86 FL (ref 79–97)
MONOCYTES # BLD AUTO: 1.36 10*3/MM3 (ref 0.1–0.9)
MONOCYTES NFR BLD AUTO: 8.5 % (ref 5–12)
NEUTROPHILS NFR BLD AUTO: 13.3 10*3/MM3 (ref 1.7–7)
NEUTROPHILS NFR BLD AUTO: 83.5 % (ref 42.7–76)
NITRITE UR QL STRIP: POSITIVE
NRBC BLD AUTO-RTO: 0 /100 WBC (ref 0–0.2)
PH UR STRIP.AUTO: 6.5 [PH] (ref 5–8)
PLATELET # BLD AUTO: 316 10*3/MM3 (ref 140–450)
PMV BLD AUTO: 9.8 FL (ref 6–12)
POTASSIUM SERPL-SCNC: 3.8 MMOL/L (ref 3.5–5.2)
PROCALCITONIN SERPL-MCNC: 0.09 NG/ML (ref 0–0.25)
PROT SERPL-MCNC: 6.8 G/DL (ref 6–8.5)
PROT UR QL STRIP: ABNORMAL
RBC # BLD AUTO: 4.57 10*6/MM3 (ref 3.77–5.28)
RBC # UR STRIP: ABNORMAL /HPF
REF LAB TEST METHOD: ABNORMAL
SODIUM SERPL-SCNC: 132 MMOL/L (ref 136–145)
SP GR UR STRIP: 1.02 (ref 1–1.03)
SQUAMOUS #/AREA URNS HPF: ABNORMAL /HPF
UROBILINOGEN UR QL STRIP: ABNORMAL
WBC # UR STRIP: ABNORMAL /HPF
WBC NRBC COR # BLD: 15.91 10*3/MM3 (ref 3.4–10.8)

## 2022-03-27 PROCEDURE — 87077 CULTURE AEROBIC IDENTIFY: CPT | Performed by: PHYSICIAN ASSISTANT

## 2022-03-27 PROCEDURE — P9612 CATHETERIZE FOR URINE SPEC: HCPCS

## 2022-03-27 PROCEDURE — 87186 SC STD MICRODIL/AGAR DIL: CPT | Performed by: PHYSICIAN ASSISTANT

## 2022-03-27 PROCEDURE — 84145 PROCALCITONIN (PCT): CPT | Performed by: PHYSICIAN ASSISTANT

## 2022-03-27 PROCEDURE — 84703 CHORIONIC GONADOTROPIN ASSAY: CPT | Performed by: PHYSICIAN ASSISTANT

## 2022-03-27 PROCEDURE — 83605 ASSAY OF LACTIC ACID: CPT | Performed by: PHYSICIAN ASSISTANT

## 2022-03-27 PROCEDURE — 80053 COMPREHEN METABOLIC PANEL: CPT | Performed by: EMERGENCY MEDICINE

## 2022-03-27 PROCEDURE — 36415 COLL VENOUS BLD VENIPUNCTURE: CPT

## 2022-03-27 PROCEDURE — 99284 EMERGENCY DEPT VISIT MOD MDM: CPT

## 2022-03-27 PROCEDURE — 81001 URINALYSIS AUTO W/SCOPE: CPT | Performed by: EMERGENCY MEDICINE

## 2022-03-27 PROCEDURE — 76817 TRANSVAGINAL US OBSTETRIC: CPT

## 2022-03-27 PROCEDURE — 85025 COMPLETE CBC W/AUTO DIFF WBC: CPT | Performed by: PHYSICIAN ASSISTANT

## 2022-03-27 PROCEDURE — 96365 THER/PROPH/DIAG IV INF INIT: CPT

## 2022-03-27 PROCEDURE — 74176 CT ABD & PELVIS W/O CONTRAST: CPT

## 2022-03-27 PROCEDURE — 25010000002 HYDROMORPHONE 1 MG/ML SOLUTION: Performed by: EMERGENCY MEDICINE

## 2022-03-27 PROCEDURE — 83690 ASSAY OF LIPASE: CPT | Performed by: PHYSICIAN ASSISTANT

## 2022-03-27 PROCEDURE — 96376 TX/PRO/DX INJ SAME DRUG ADON: CPT

## 2022-03-27 PROCEDURE — 25010000002 CEFTRIAXONE SODIUM-DEXTROSE 1-3.74 GM-%(50ML) RECONSTITUTED SOLUTION: Performed by: PHYSICIAN ASSISTANT

## 2022-03-27 PROCEDURE — 25010000002 ONDANSETRON PER 1 MG: Performed by: EMERGENCY MEDICINE

## 2022-03-27 PROCEDURE — 96375 TX/PRO/DX INJ NEW DRUG ADDON: CPT

## 2022-03-27 PROCEDURE — 76775 US EXAM ABDO BACK WALL LIM: CPT

## 2022-03-27 PROCEDURE — 25010000002 MORPHINE PER 10 MG: Performed by: EMERGENCY MEDICINE

## 2022-03-27 PROCEDURE — 87086 URINE CULTURE/COLONY COUNT: CPT | Performed by: PHYSICIAN ASSISTANT

## 2022-03-27 RX ORDER — ONDANSETRON 2 MG/ML
4 INJECTION INTRAMUSCULAR; INTRAVENOUS ONCE
Status: COMPLETED | OUTPATIENT
Start: 2022-03-27 | End: 2022-03-27

## 2022-03-27 RX ORDER — CEFTRIAXONE 1 G/50ML
1 INJECTION, SOLUTION INTRAVENOUS ONCE
Status: COMPLETED | OUTPATIENT
Start: 2022-03-27 | End: 2022-03-27

## 2022-03-27 RX ORDER — IBUPROFEN 800 MG/1
800 TABLET ORAL EVERY 6 HOURS PRN
COMMUNITY

## 2022-03-27 RX ORDER — SODIUM CHLORIDE 0.9 % (FLUSH) 0.9 %
10 SYRINGE (ML) INJECTION AS NEEDED
Status: DISCONTINUED | OUTPATIENT
Start: 2022-03-27 | End: 2022-03-28 | Stop reason: HOSPADM

## 2022-03-27 RX ORDER — FUROSEMIDE 20 MG/1
10 TABLET ORAL DAILY
COMMUNITY

## 2022-03-27 RX ORDER — MORPHINE SULFATE 4 MG/ML
4 INJECTION, SOLUTION INTRAMUSCULAR; INTRAVENOUS ONCE
Status: COMPLETED | OUTPATIENT
Start: 2022-03-27 | End: 2022-03-27

## 2022-03-27 RX ORDER — PROPRANOLOL HYDROCHLORIDE 40 MG/1
60 TABLET ORAL DAILY
COMMUNITY

## 2022-03-27 RX ADMIN — HYDROMORPHONE HYDROCHLORIDE 1 MG: 1 INJECTION, SOLUTION INTRAMUSCULAR; INTRAVENOUS; SUBCUTANEOUS at 20:48

## 2022-03-27 RX ADMIN — MORPHINE SULFATE 4 MG: 4 INJECTION, SOLUTION INTRAMUSCULAR; INTRAVENOUS at 18:04

## 2022-03-27 RX ADMIN — ONDANSETRON 4 MG: 2 INJECTION INTRAMUSCULAR; INTRAVENOUS at 18:03

## 2022-03-27 RX ADMIN — MORPHINE SULFATE 4 MG: 4 INJECTION, SOLUTION INTRAMUSCULAR; INTRAVENOUS at 20:18

## 2022-03-27 RX ADMIN — HYDROMORPHONE HYDROCHLORIDE 1 MG: 1 INJECTION, SOLUTION INTRAMUSCULAR; INTRAVENOUS; SUBCUTANEOUS at 23:36

## 2022-03-27 RX ADMIN — CEFTRIAXONE 1 G: 1 INJECTION, SOLUTION INTRAVENOUS at 21:26

## 2022-03-27 RX ADMIN — SODIUM CHLORIDE 1000 ML: 9 INJECTION, SOLUTION INTRAVENOUS at 18:07

## 2022-03-27 RX ADMIN — SODIUM CHLORIDE 1000 ML: 9 INJECTION, SOLUTION INTRAVENOUS at 20:18

## 2022-03-27 NOTE — ED PROVIDER NOTES
Subjective   Chief Complaint: Left flank pain, nausea vomiting, dysuria  History of Present Illness: 36-year-old female comes in for evaluation above complaint.  She has a history kidney stones and states this pain feels similar.  Last evening she had acute onset of left flank pain, decreased urine output, some dysuria, nausea vomiting.  Pain is constant and colicky with intermittent sharp stabbing pains.  Onset: Last night  Timing: Constant colicky pains, intermittent sharp stabbing pains  Exacerbating / Alleviating factors: None  Associated symptoms: None      Nurses Notes reviewed and agree, including vitals, allergies, social history and prior medical history.          Review of Systems   Constitutional: Negative.    HENT: Negative.    Eyes: Negative.    Respiratory: Negative.    Cardiovascular: Negative.    Gastrointestinal: Positive for nausea and vomiting.   Genitourinary: Positive for decreased urine volume, dysuria and flank pain.   Skin: Negative.    Neurological: Negative.    Psychiatric/Behavioral: Negative.        Past Medical History:   Diagnosis Date   • Disease of thyroid gland    • Kidney stone    • Migraines    • MTHFR deficiency complicating pregnancy (HCC)    • PAT (paroxysmal atrial tachycardia) (HCC)        Allergies   Allergen Reactions   • Elavil [Amitriptyline] Anxiety   • Percocet [Oxycodone-Acetaminophen] Hives   • Toradol [Ketorolac Tromethamine] Rash   • Tramadol Rash       Past Surgical History:   Procedure Laterality Date   •  SECTION     • DIAGNOSTIC LAPAROSCOPY     • KNEE SURGERY Right    • SHOULDER SURGERY Right    • TONSILLECTOMY         History reviewed. No pertinent family history.    Social History     Socioeconomic History   • Marital status:    Tobacco Use   • Smoking status: Current Every Day Smoker     Packs/day: 0.50     Types: Cigarettes   • Smokeless tobacco: Never Used   Vaping Use   • Vaping Use: Never used   Substance and Sexual Activity   • Alcohol use:  Not Currently     Comment: rare   • Drug use: Never   • Sexual activity: Never           Objective   Physical Exam  Vitals and nursing note reviewed.   Constitutional:       General: She is not in acute distress.     Appearance: Normal appearance. She is not ill-appearing, toxic-appearing or diaphoretic.   HENT:      Head: Normocephalic and atraumatic.   Eyes:      Extraocular Movements: Extraocular movements intact.   Cardiovascular:      Rate and Rhythm: Normal rate and regular rhythm.   Pulmonary:      Effort: Pulmonary effort is normal.   Abdominal:      General: Abdomen is flat.      Palpations: Abdomen is soft.      Tenderness: There is left CVA tenderness.   Musculoskeletal:         General: Normal range of motion.      Cervical back: Normal range of motion.   Skin:     General: Skin is warm and dry.   Neurological:      General: No focal deficit present.      Mental Status: She is alert.   Psychiatric:         Mood and Affect: Mood normal.         Behavior: Behavior normal.         Procedures           ED Course  ED Course as of 03/27/22 2356   Sun Mar 27, 2022   1822 WBC(!): 15.91 [TM]   1847 HCG Qualitative(!): Positive [TM]   2054 Nitrite, UA(!): Positive [TM]   2054 Leukocytes, UA(!): Moderate (2+) [TM]   2054 WBC, UA(!): 0-2 [TM]   2054 Bacteria, UA(!): 4+ [TM]   2238 Creatinine(!): 1.20 [TM]      ED Course User Index  [TM] Elmer German PA-C                                                 [facility]  2229  No urology coverage here, given hydronephrosis UTI have to presume an infected left ureteral stone.  there is a wait list to speak with the transfer doctor at the [facility].  We have checked [facility] and there are no beds available.  Also no reported beds available at [facility].  We will continue to monitor and await ability to discuss a transfer patient is stable, vitals stable, fluids and antibiotics have been administered.    1528  Spoke with Dr. Shore and the  urologist on-call at [facility].  Urology recommends a CT scan even though the patient is pregnant to determine whether or not there is actually an obstructing stone determine whether or not the patient would need to undergo emergent surgery to have a stent placed or whether this would be treated more medically as a pyelonephritis.  Patient understands risk benefit of CT.  She states she likely would plan to have an  anyway as she does not think at this time she wants care this child to term.  Case labs management discussed with Dr. Schulz.  We will proceed with CT imaging at this time.    2339  Noted 7.5 mm obstructing left UPJ stone.  Patient will require transfer for stenting and urology consults.  Gauze in place to the [facility] and we have been placed on a wait list again.  Final diagnoses:   Left ureteral stone   Urinary tract infection without hematuria, site unspecified       ED Disposition  ED Disposition     ED Disposition   Transfer to Another Facility     Condition   --    Comment   --             No follow-up provider specified.       Medication List      No changes were made to your prescriptions during this visit.          Elmer German PA-C  [id number]

## 2022-03-28 VITALS
OXYGEN SATURATION: 98 % | DIASTOLIC BLOOD PRESSURE: 55 MMHG | SYSTOLIC BLOOD PRESSURE: 105 MMHG | HEIGHT: 65 IN | RESPIRATION RATE: 16 BRPM | HEART RATE: 98 BPM | BODY MASS INDEX: 30.32 KG/M2 | WEIGHT: 182 LBS | TEMPERATURE: 99.6 F

## 2022-03-28 PROCEDURE — 99284 EMERGENCY DEPT VISIT MOD MDM: CPT

## 2022-03-28 PROCEDURE — 96376 TX/PRO/DX INJ SAME DRUG ADON: CPT

## 2022-03-28 PROCEDURE — 25010000002 HYDROMORPHONE 1 MG/ML SOLUTION: Performed by: EMERGENCY MEDICINE

## 2022-03-28 RX ADMIN — HYDROMORPHONE HYDROCHLORIDE 1 MG: 1 INJECTION, SOLUTION INTRAMUSCULAR; INTRAVENOUS; SUBCUTANEOUS at 00:59

## 2022-03-28 NOTE — ED NOTES
Contacted [facility] at [facility] to initiate transfer per Elmer.  Per [facility], there are no beds available.  Patient would have to be placed on waiting list.  Will advise Elmer of bed status at [facility].

## 2022-03-30 LAB — BACTERIA SPEC AEROBE CULT: ABNORMAL

## 2022-04-07 ENCOUNTER — OFFICE VISIT (OUTPATIENT)
Dept: UROLOGY | Facility: CLINIC | Age: 37
End: 2022-04-07

## 2022-04-07 VITALS
OXYGEN SATURATION: 97 % | DIASTOLIC BLOOD PRESSURE: 78 MMHG | SYSTOLIC BLOOD PRESSURE: 124 MMHG | TEMPERATURE: 97.6 F | WEIGHT: 182 LBS | HEART RATE: 91 BPM | HEIGHT: 65 IN | BODY MASS INDEX: 30.32 KG/M2

## 2022-04-07 DIAGNOSIS — N20.0 NEPHROLITHIASIS: Primary | ICD-10-CM

## 2022-04-07 PROCEDURE — 99204 OFFICE O/P NEW MOD 45 MIN: CPT | Performed by: PHYSICIAN ASSISTANT

## 2022-04-07 RX ORDER — ONDANSETRON 4 MG/1
4 TABLET, FILM COATED ORAL DAILY PRN
Qty: 30 TABLET | Refills: 1 | Status: SHIPPED | OUTPATIENT
Start: 2022-04-07

## 2022-04-07 RX ORDER — GABAPENTIN 300 MG/1
CAPSULE ORAL
COMMUNITY
Start: 2021-12-08

## 2022-04-07 RX ORDER — OXYCODONE HYDROCHLORIDE 5 MG/1
5 TABLET ORAL EVERY 6 HOURS PRN
Qty: 5 TABLET | Refills: 0 | Status: ON HOLD | OUTPATIENT
Start: 2022-04-07 | End: 2022-04-11 | Stop reason: SDUPTHER

## 2022-04-07 RX ORDER — OXYBUTYNIN CHLORIDE 5 MG/1
5 TABLET, EXTENDED RELEASE ORAL
COMMUNITY
Start: 2022-04-01 | End: 2022-04-07

## 2022-04-07 RX ORDER — METHOCARBAMOL 500 MG/1
500 TABLET, FILM COATED ORAL 3 TIMES DAILY
COMMUNITY

## 2022-04-07 RX ORDER — OXYBUTYNIN CHLORIDE 10 MG/1
10 TABLET, EXTENDED RELEASE ORAL DAILY
Qty: 30 TABLET | Refills: 1 | Status: ON HOLD | OUTPATIENT
Start: 2022-04-07 | End: 2022-04-11 | Stop reason: SDUPTHER

## 2022-04-07 RX ORDER — CEFDINIR 300 MG/1
300 CAPSULE ORAL 2 TIMES DAILY
COMMUNITY
Start: 2022-03-30 | End: 2022-04-11 | Stop reason: HOSPADM

## 2022-04-07 RX ORDER — SODIUM CHLORIDE 9 MG/ML
100 INJECTION, SOLUTION INTRAVENOUS CONTINUOUS
Status: CANCELLED | OUTPATIENT
Start: 2022-04-07

## 2022-04-07 RX ORDER — ACETAMINOPHEN 500 MG
1000 TABLET ORAL EVERY 6 HOURS PRN
COMMUNITY
Start: 2022-03-30 | End: 2022-04-09

## 2022-04-07 RX ORDER — PHENAZOPYRIDINE HYDROCHLORIDE 100 MG/1
100 TABLET, FILM COATED ORAL 3 TIMES DAILY PRN
Qty: 21 TABLET | Refills: 0 | Status: ON HOLD | OUTPATIENT
Start: 2022-04-07 | End: 2022-04-11 | Stop reason: SDUPTHER

## 2022-04-07 RX ORDER — PSEUDOEPHEDRINE HCL 30 MG
100 TABLET ORAL DAILY
COMMUNITY
Start: 2022-04-01

## 2022-04-07 RX ORDER — TAMSULOSIN HYDROCHLORIDE 0.4 MG/1
1 CAPSULE ORAL NIGHTLY
Qty: 10 CAPSULE | Refills: 0 | Status: ON HOLD | OUTPATIENT
Start: 2022-04-07 | End: 2022-04-11 | Stop reason: SDUPTHER

## 2022-04-08 ENCOUNTER — DOCUMENTATION (OUTPATIENT)
Dept: UROLOGY | Facility: CLINIC | Age: 37
End: 2022-04-08

## 2022-04-08 ENCOUNTER — OFFICE VISIT (OUTPATIENT)
Dept: UROLOGY | Facility: CLINIC | Age: 37
End: 2022-04-08

## 2022-04-08 ENCOUNTER — PRE-ADMISSION TESTING (OUTPATIENT)
Dept: PREADMISSION TESTING | Facility: HOSPITAL | Age: 37
End: 2022-04-08

## 2022-04-08 VITALS — HEIGHT: 66 IN | WEIGHT: 182 LBS | BODY MASS INDEX: 29.25 KG/M2

## 2022-04-08 DIAGNOSIS — N20.0 NEPHROLITHIASIS: Primary | ICD-10-CM

## 2022-04-08 DIAGNOSIS — N20.0 NEPHROLITHIASIS: ICD-10-CM

## 2022-04-08 LAB
ANION GAP SERPL CALCULATED.3IONS-SCNC: 9.8 MMOL/L (ref 5–15)
B-HCG UR QL: POSITIVE
BACTERIA UR QL AUTO: ABNORMAL /HPF
BILIRUB UR QL STRIP: NEGATIVE
BUN SERPL-MCNC: 11 MG/DL (ref 6–20)
BUN/CREAT SERPL: 14.9 (ref 7–25)
CALCIUM SPEC-SCNC: 9.3 MG/DL (ref 8.6–10.5)
CHLORIDE SERPL-SCNC: 103 MMOL/L (ref 98–107)
CLARITY UR: ABNORMAL
CO2 SERPL-SCNC: 20.2 MMOL/L (ref 22–29)
COLOR UR: ABNORMAL
CREAT SERPL-MCNC: 0.74 MG/DL (ref 0.57–1)
DEPRECATED RDW RBC AUTO: 43.8 FL (ref 37–54)
EGFRCR SERPLBLD CKD-EPI 2021: 109 ML/MIN/1.73
ERYTHROCYTE [DISTWIDTH] IN BLOOD BY AUTOMATED COUNT: 13.2 % (ref 12.3–15.4)
GLUCOSE SERPL-MCNC: 82 MG/DL (ref 65–99)
GLUCOSE UR STRIP-MCNC: NEGATIVE MG/DL
HCT VFR BLD AUTO: 39.7 % (ref 34–46.6)
HGB BLD-MCNC: 13.3 G/DL (ref 12–15.9)
HGB UR QL STRIP.AUTO: ABNORMAL
HYALINE CASTS UR QL AUTO: ABNORMAL /LPF
KETONES UR QL STRIP: NEGATIVE
LEUKOCYTE ESTERASE UR QL STRIP.AUTO: ABNORMAL
MCH RBC QN AUTO: 29.9 PG (ref 26.6–33)
MCHC RBC AUTO-ENTMCNC: 33.5 G/DL (ref 31.5–35.7)
MCV RBC AUTO: 89.2 FL (ref 79–97)
NITRITE UR QL STRIP: POSITIVE
PH UR STRIP.AUTO: 6.5 [PH] (ref 5–8)
PLATELET # BLD AUTO: 415 10*3/MM3 (ref 140–450)
PMV BLD AUTO: 9.6 FL (ref 6–12)
POTASSIUM SERPL-SCNC: 4.3 MMOL/L (ref 3.5–5.2)
PROT UR QL STRIP: ABNORMAL
RBC # BLD AUTO: 4.45 10*6/MM3 (ref 3.77–5.28)
RBC # UR STRIP: ABNORMAL /HPF
REF LAB TEST METHOD: ABNORMAL
SODIUM SERPL-SCNC: 133 MMOL/L (ref 136–145)
SP GR UR STRIP: 1.02 (ref 1–1.03)
SQUAMOUS #/AREA URNS HPF: ABNORMAL /HPF
UROBILINOGEN UR QL STRIP: ABNORMAL
WBC # UR STRIP: ABNORMAL /HPF
WBC NRBC COR # BLD: 10.31 10*3/MM3 (ref 3.4–10.8)

## 2022-04-08 PROCEDURE — 87086 URINE CULTURE/COLONY COUNT: CPT

## 2022-04-08 PROCEDURE — 81025 URINE PREGNANCY TEST: CPT

## 2022-04-08 PROCEDURE — 80048 BASIC METABOLIC PNL TOTAL CA: CPT

## 2022-04-08 PROCEDURE — 81001 URINALYSIS AUTO W/SCOPE: CPT

## 2022-04-08 PROCEDURE — 36415 COLL VENOUS BLD VENIPUNCTURE: CPT

## 2022-04-08 PROCEDURE — 99443 PR PHYS/QHP TELEPHONE EVALUATION 21-30 MIN: CPT | Performed by: UROLOGY

## 2022-04-08 PROCEDURE — C9803 HOPD COVID-19 SPEC COLLECT: HCPCS

## 2022-04-08 PROCEDURE — 93005 ELECTROCARDIOGRAM TRACING: CPT

## 2022-04-08 PROCEDURE — 93010 ELECTROCARDIOGRAM REPORT: CPT | Performed by: INTERNAL MEDICINE

## 2022-04-08 PROCEDURE — 85027 COMPLETE CBC AUTOMATED: CPT

## 2022-04-08 PROCEDURE — U0004 COV-19 TEST NON-CDC HGH THRU: HCPCS

## 2022-04-08 NOTE — PAT
Patient seen today for PAT appointment for upcoming procedure with Dr. Billy 4/11/22. Patient complains of angina for the last 15 years since diagnosis of paroxysmal atrial tachycardia and [facility]. EKG obtained today per protocol and unverified reads NSR cannot rule out anterior infarct, age undetermined. Luis Motley CRNA notified of patient history and EKG results. He would like patient to receive a cardiac clearance before proceeding with procedure. Dr. Billy's office notified over phone and fax of cardiac clearance request.

## 2022-04-08 NOTE — PROGRESS NOTES
Received a call from [facility] that the patient's screening EKG was abnormal and would require cardiac clearance.  They affirmed that this EKG has not been interpreted by a medical provider and has not yet been over-read by cardiology.  We attempt to reach cardiology for further interpretation of the patient's EKG, however it is the end of the business day and there is no answer.  This [facility] flag is discussed with Dr. Billy, who will follow up further with anesthesia and the patient.  I called the patient at her listed cell phone number and let her know that we will continue to work on any needed cardiac clearance prior to her upcoming urgent surgery.  She states understanding and appreciates follow-up call from Dr. Billy.

## 2022-04-08 NOTE — PROGRESS NOTES
Chief Complaint  Kidney Stone    Elmer German Darnell*    HPI  Ms. Mays is a 36 y.o. female who presents for further management of nephrolithiasis.    She presented to [facility] on 22 and was diagnosed with a 7mm left UPJ stone and UTI. She was transferred to  for stent placement on  and completed antibiotics.  She presents today for follow up.  She is currently experiencing severe left flank pain, urinary urgency, n/v.  No fever. She was also found to be approximately 7 weeks pregnant. She is G6 and has been previously told that pregnancy is life-threatening to her. She will have the pregnancy terminated on .     Stone related history:  Family history of kidney stones  no  Renal disease or anatomic abnormality: YES, large cysts  Malabsorptive disease or gastric bypass: no  Frequent UTI's    no  Parathyroid disease    no    Dietary Considerations  Soda - 5 per day  Fast food - 1 per week  Water - 3 glasses per day  Yes; Adds salt to foods    Past Medical History  Past Medical History:   Diagnosis Date   • Anxiety    • Blood clotting disorder (HCC)     superior and inferior vena cava current blood clots   • Depression    • Disease of thyroid gland    • History of angina     x15 years, sees cardiologist Q6 months at the [facility]. R/T PAT and palpitations   • Kidney stone    • Migraines    • MTHFR deficiency complicating pregnancy (formerly [facility])    • PAT (paroxysmal atrial tachycardia) (formerly [facility])    • Piercing     ears   • PTSD (post-traumatic stress disorder)    • Tattoo    • Wears glasses        Past Surgical History  Past Surgical History:   Procedure Laterality Date   • CARDIAC CATHETERIZATION     •  SECTION      x2   • DIAGNOSTIC LAPAROSCOPY     • EAR TUBES Bilateral    • KIDNEY SURGERY      stent in left kidney   • KNEE SURGERY Right    • SHOULDER SURGERY Right    • TONSILLECTOMY     • WISDOM TOOTH EXTRACTION         Medications    Current Outpatient Medications:   •  acetaminophen (TYLENOL)  500 MG tablet, Take 1,000 mg by mouth Every 6 (Six) Hours As Needed., Disp: , Rfl:   •  cefdinir (OMNICEF) 300 MG capsule, Take 300 mg by mouth 2 (Two) Times a Day., Disp: , Rfl:   •  docusate sodium 100 MG capsule, Take 100 mg by mouth Daily., Disp: , Rfl:   •  enoxaparin (LOVENOX) 60 MG/0.6ML solution syringe, Inject 60 mg under the skin into the appropriate area as directed Every 12 (Twelve) Hours., Disp: , Rfl:   •  furosemide (LASIX) 20 MG tablet, Take 10 mg by mouth Daily., Disp: , Rfl:   •  gabapentin (NEURONTIN) 300 MG capsule, TAKE ONE CAPSULE BY MOUTH DAILY AT BEDTIME FOR PAIN, Disp: , Rfl:   •  ibuprofen (ADVIL,MOTRIN) 800 MG tablet, Take 800 mg by mouth Every 6 (Six) Hours As Needed for Mild Pain ., Disp: , Rfl:   •  methocarbamol (ROBAXIN) 500 MG tablet, Take 500 mg by mouth 3 (Three) Times a Day., Disp: , Rfl:   •  propranolol (INDERAL) 40 MG tablet, Take 60 mg by mouth Daily., Disp: , Rfl:   •  diclofenac (VOLTAREN) 50 MG EC tablet, Take 1 tablet by mouth 2 (Two) Times a Day for 3 days. Start taking the morning of the procedure, Disp: 6 tablet, Rfl: 0  •  ondansetron (Zofran) 4 MG tablet, Take 1 tablet by mouth Daily As Needed for Nausea or Vomiting., Disp: 30 tablet, Rfl: 1  •  oxybutynin XL (Ditropan XL) 10 MG 24 hr tablet, Take 1 tablet by mouth Daily., Disp: 30 tablet, Rfl: 1  •  oxyCODONE (Roxicodone) 5 MG immediate release tablet, Take 1 tablet by mouth Every 6 (Six) Hours As Needed for Moderate Pain  or Severe Pain ., Disp: 5 tablet, Rfl: 0  •  phenazopyridine (PYRIDIUM) 100 MG tablet, Take 1 tablet by mouth 3 (Three) Times a Day As Needed (urinary burning)., Disp: 21 tablet, Rfl: 0  •  tamsulosin (FLOMAX) 0.4 MG capsule 24 hr capsule, Take 1 capsule by mouth Every Night., Disp: 10 capsule, Rfl: 0    Allergies  Allergies   Allergen Reactions   • Elavil [Amitriptyline] Anxiety   • Percocet [Oxycodone-Acetaminophen] Hives     Can take tylenol   • Toradol [Ketorolac Tromethamine] Rash   • Tramadol  "Rash   • Contrast Dye Nausea And Vomiting     Pt states it also causes her to faint       Social History  Social History     Socioeconomic History   • Marital status:    Tobacco Use   • Smoking status: Current Every Day Smoker     Packs/day: 0.50     Types: Cigarettes   • Smokeless tobacco: Never Used   Vaping Use   • Vaping Use: Never used   Substance and Sexual Activity   • Alcohol use: Not Currently     Comment: rare   • Drug use: Never   • Sexual activity: Never       Family History  History reviewed. No pertinent family history.        Physical Exam  Visit Vitals  /78   Pulse 91   Temp 97.6 °F (36.4 °C)   Ht 165.1 cm (65\")   Wt 82.6 kg (182 lb)   SpO2 97%   BMI 30.29 kg/m²       Labs  Lab Results   Component Value Date    GLUCOSE 128 (H) 03/27/2022    BUN 7 03/30/2022    CREATININE 0.71 03/30/2022    EGFRIFNONA >60 03/30/2022    EGFRIFAFRI >60 03/30/2022    BCR 10 03/30/2022    K 4.8 03/30/2022    CO2 24 03/30/2022    CALCIUM 8.9 03/30/2022    ALBUMIN 4.20 03/27/2022    AST 10 03/27/2022    ALT 16 03/27/2022       Lab Results   Component Value Date    WBC 6.28 03/30/2022    HGB 10.8 (L) 03/30/2022    HCT 34.0 03/30/2022    MCV 91 03/30/2022     03/30/2022       No results found for: LDH, URICACID    Lab Results   Component Value Date    CALCIUM 8.9 03/30/2022       Brief Urine Lab Results  (Last result in the past 365 days)      Color   Clarity   Blood   Leuk Est   Nitrite   Protein   CREAT   Urine HCG        03/28/22 0247 Yellow   Clear     Small                       Radiologic Studies  US Renal Bilateral    Result Date: 3/27/2022  Mild left hydronephrosis.  Bilateral renal calculi. Authenticated by Cameron Poole M.D. on 03/27/2022 08:43:37 PM    CT Abdomen Pelvis Stone Protocol    Result Date: 3/28/2022  7.5 mm obstructing left UPJ stone.  Nonobstructing left renal calculus. Authenticated by Cameron Poole M.D. on 03/28/2022 12:00:55 AM      I have personally reviewed these labs and " "images.      Assessment  Ms. Mays is a 36 y.o. female with 7.5mm left UVJ stone c/b UTI and stented at  on  presenting for definitive stone management.       Her inpatient stay from  is summarized below from her discharge summary:  \"Francesca Mays is a 36 y.o.  who presented to ED for acute left flank pain and was found to have 7mm calculus at ureteropelvic junction. She underwent cystoscopy and stent placement by urology on 3/28. On arrival to ED she was found to be pregnant with TVUS dating at 7w2d by CRL (MARIAMA 22). Maternal fetal medicine was consulted for admission postoperatively. She received 48h of Rocephin and remained afebrile throughout admission. Cr initially was 1.26 on admission but normalized postoperatively and was 0.71 on discharge. She was transitioned to PO antibiotics on HD #3 and was discharged with urology follow up and information provided on termination , which patient requested.\"      The patient is discussed with Dr. Billy who independently reviews and interprets associated imaging. Based on patient factors and the stone size and location, Dr. Billy recommends URS with stent exchange.  We discussed the risks, benefits, and alternatives to this therapy.  The main risks that we discussed were bleeding, urinary infection, damage to nearby structures, need for further procedures, and cardiopulmonary complications from anesthesia.  The patient voiced understanding and wished to proceed.     Of note, the patient is certain she is terminating the pregnancy on . She wants all surgery and medications to help her condition, regardless of impact on the fetus or pregnancy.      Plan  1. Consented for left URS/HLL with stent exchange, 22  2. Increase water intake >2L / day  3. Symptomatic meds: oxybutynin XL 10mg, diclofenac 50mg bid, oxycodone 5mg, APAP, pyridium, flomax              "

## 2022-04-09 DIAGNOSIS — N20.0 NEPHROLITHIASIS: Primary | ICD-10-CM

## 2022-04-09 LAB
BACTERIA SPEC AEROBE CULT: NORMAL
SARS-COV-2 RNA PNL SPEC NAA+PROBE: NOT DETECTED

## 2022-04-09 RX ORDER — LEVOFLOXACIN 500 MG/1
500 TABLET, FILM COATED ORAL DAILY
Qty: 7 TABLET | Refills: 0 | Status: SHIPPED | OUTPATIENT
Start: 2022-04-09 | End: 2022-04-16

## 2022-04-09 NOTE — PROGRESS NOTES
22-minute telephone conversation with the patient  Patient and I were both at home.     I was made aware of abnormal ECG.  It is nonspecific.  Patient has no chest pain.  She had a normal echo in 2020 with Dr. Cobb.  She does have a longstanding stable history of paroxysmal atrial tachycardia and mitral valve prolapse.  Dr. Mcadams and I reviewed all of her medical and cardiac history, and there are no contraindications to proceeding with urgent surgery that she needs to treat her ureteral stone.  If anesthesia has any concerns they should contact his office preoperatively.    Additionally her urinalysis did show nitrites and 1+ bacteria.  She is on culture sensitive cefdinir for E. Coli, from culture at .  We had a discussion and I will switch her to Levaquin based on the fact that she still has bacteria in her urine.  I warned her that this medication is toxic to the fetus, and she stated that she understood and still very much plans to terminate her pregnancy on April 20.  We discussed the risks and benefits of decision, and benefits outweigh risks, has with insufficient antibiotic treatment she could become septic with surgery.     You have chosen to receive care through a telephone visit. Do you consent to use a telephone visit for your medical care today? Yes

## 2022-04-11 ENCOUNTER — ANESTHESIA (OUTPATIENT)
Dept: PERIOP | Facility: HOSPITAL | Age: 37
End: 2022-04-11

## 2022-04-11 ENCOUNTER — HOSPITAL ENCOUNTER (OUTPATIENT)
Facility: HOSPITAL | Age: 37
Setting detail: HOSPITAL OUTPATIENT SURGERY
Discharge: HOME | End: 2022-04-11
Attending: UROLOGY | Admitting: UROLOGY

## 2022-04-11 ENCOUNTER — ANESTHESIA EVENT (OUTPATIENT)
Dept: PERIOP | Facility: HOSPITAL | Age: 37
End: 2022-04-11

## 2022-04-11 VITALS
TEMPERATURE: 98.3 F | OXYGEN SATURATION: 98 % | HEART RATE: 78 BPM | DIASTOLIC BLOOD PRESSURE: 64 MMHG | SYSTOLIC BLOOD PRESSURE: 122 MMHG | RESPIRATION RATE: 18 BRPM

## 2022-04-11 DIAGNOSIS — N20.0 NEPHROLITHIASIS: ICD-10-CM

## 2022-04-11 PROCEDURE — 25010000002 HYDROMORPHONE 1 MG/ML SOLUTION: Performed by: NURSE ANESTHETIST, CERTIFIED REGISTERED

## 2022-04-11 PROCEDURE — 25010000002 HYDROMORPHONE PER 4 MG: Performed by: NURSE ANESTHETIST, CERTIFIED REGISTERED

## 2022-04-11 PROCEDURE — C1758 CATHETER, URETERAL: HCPCS | Performed by: UROLOGY

## 2022-04-11 PROCEDURE — 25010000002 PROPOFOL 200 MG/20ML EMULSION: Performed by: NURSE ANESTHETIST, CERTIFIED REGISTERED

## 2022-04-11 PROCEDURE — C2617 STENT, NON-COR, TEM W/O DEL: HCPCS | Performed by: UROLOGY

## 2022-04-11 PROCEDURE — 52356 CYSTO/URETERO W/LITHOTRIPSY: CPT | Performed by: UROLOGY

## 2022-04-11 PROCEDURE — C1894 INTRO/SHEATH, NON-LASER: HCPCS | Performed by: UROLOGY

## 2022-04-11 PROCEDURE — 82365 CALCULUS SPECTROSCOPY: CPT | Performed by: UROLOGY

## 2022-04-11 PROCEDURE — C1769 GUIDE WIRE: HCPCS | Performed by: UROLOGY

## 2022-04-11 PROCEDURE — 25010000002 IOPAMIDOL 61 % SOLUTION: Performed by: UROLOGY

## 2022-04-11 PROCEDURE — 25010000002 ONDANSETRON PER 1 MG: Performed by: NURSE ANESTHETIST, CERTIFIED REGISTERED

## 2022-04-11 PROCEDURE — 0 CEFAZOLIN SODIUM-DEXTROSE 2-3 GM-%(50ML) RECONSTITUTED SOLUTION: Performed by: UROLOGY

## 2022-04-11 DEVICE — URETERAL STENT
Type: IMPLANTABLE DEVICE | Site: URETER | Status: FUNCTIONAL
Brand: CONTOUR™

## 2022-04-11 RX ORDER — SODIUM CHLORIDE 9 MG/ML
100 INJECTION, SOLUTION INTRAVENOUS CONTINUOUS
Status: DISCONTINUED | OUTPATIENT
Start: 2022-04-11 | End: 2022-04-11 | Stop reason: HOSPADM

## 2022-04-11 RX ORDER — PROPOFOL 10 MG/ML
INJECTION, EMULSION INTRAVENOUS AS NEEDED
Status: DISCONTINUED | OUTPATIENT
Start: 2022-04-11 | End: 2022-04-11 | Stop reason: SURG

## 2022-04-11 RX ORDER — HYDROMORPHONE HCL 110MG/55ML
PATIENT CONTROLLED ANALGESIA SYRINGE INTRAVENOUS AS NEEDED
Status: DISCONTINUED | OUTPATIENT
Start: 2022-04-11 | End: 2022-04-11 | Stop reason: SURG

## 2022-04-11 RX ORDER — ONDANSETRON 2 MG/ML
INJECTION INTRAMUSCULAR; INTRAVENOUS AS NEEDED
Status: DISCONTINUED | OUTPATIENT
Start: 2022-04-11 | End: 2022-04-11 | Stop reason: SURG

## 2022-04-11 RX ORDER — ACETAMINOPHEN 500 MG
1000 TABLET ORAL EVERY 6 HOURS
Qty: 30 TABLET | Refills: 0 | Status: SHIPPED | OUTPATIENT
Start: 2022-04-11 | End: 2022-04-15

## 2022-04-11 RX ORDER — OXYBUTYNIN CHLORIDE 10 MG/1
10 TABLET, EXTENDED RELEASE ORAL DAILY
Qty: 30 TABLET | Refills: 1 | Status: SHIPPED | OUTPATIENT
Start: 2022-04-11

## 2022-04-11 RX ORDER — TAMSULOSIN HYDROCHLORIDE 0.4 MG/1
1 CAPSULE ORAL NIGHTLY
Qty: 10 CAPSULE | Refills: 0 | Status: SHIPPED | OUTPATIENT
Start: 2022-04-11

## 2022-04-11 RX ORDER — OXYCODONE HYDROCHLORIDE 5 MG/1
5 TABLET ORAL EVERY 6 HOURS PRN
Qty: 5 TABLET | Refills: 0 | Status: SHIPPED | OUTPATIENT
Start: 2022-04-11

## 2022-04-11 RX ORDER — CEFAZOLIN SODIUM 2 G/50ML
2 SOLUTION INTRAVENOUS ONCE
Status: COMPLETED | OUTPATIENT
Start: 2022-04-11 | End: 2022-04-11

## 2022-04-11 RX ORDER — LIDOCAINE HYDROCHLORIDE 20 MG/ML
INJECTION, SOLUTION INTRAVENOUS AS NEEDED
Status: DISCONTINUED | OUTPATIENT
Start: 2022-04-11 | End: 2022-04-11 | Stop reason: SURG

## 2022-04-11 RX ORDER — PHENAZOPYRIDINE HYDROCHLORIDE 100 MG/1
100 TABLET, FILM COATED ORAL 3 TIMES DAILY PRN
Qty: 21 TABLET | Refills: 0 | Status: SHIPPED | OUTPATIENT
Start: 2022-04-11

## 2022-04-11 RX ORDER — ATROPA BELLADONNA AND OPIUM 16.2; 3 MG/1; MG/1
SUPPOSITORY RECTAL AS NEEDED
Status: DISCONTINUED | OUTPATIENT
Start: 2022-04-11 | End: 2022-04-11 | Stop reason: HOSPADM

## 2022-04-11 RX ADMIN — PROPOFOL 180 MG: 10 INJECTION, EMULSION INTRAVENOUS at 07:34

## 2022-04-11 RX ADMIN — CEFAZOLIN SODIUM 2 G: 2 SOLUTION INTRAVENOUS at 07:36

## 2022-04-11 RX ADMIN — HYDROMORPHONE HYDROCHLORIDE 0.5 MG: 2 INJECTION, SOLUTION INTRAMUSCULAR; INTRAVENOUS; SUBCUTANEOUS at 07:34

## 2022-04-11 RX ADMIN — SODIUM CHLORIDE 100 ML/HR: 9 INJECTION, SOLUTION INTRAVENOUS at 07:15

## 2022-04-11 RX ADMIN — ONDANSETRON 4 MG: 2 INJECTION INTRAMUSCULAR; INTRAVENOUS at 07:43

## 2022-04-11 RX ADMIN — HYDROMORPHONE HYDROCHLORIDE 0.5 MG: 2 INJECTION, SOLUTION INTRAMUSCULAR; INTRAVENOUS; SUBCUTANEOUS at 07:29

## 2022-04-11 RX ADMIN — LIDOCAINE HYDROCHLORIDE 60 MG: 20 INJECTION, SOLUTION INTRAVENOUS at 07:34

## 2022-04-11 RX ADMIN — HYDROMORPHONE HYDROCHLORIDE 0.5 MG: 2 INJECTION, SOLUTION INTRAMUSCULAR; INTRAVENOUS; SUBCUTANEOUS at 07:40

## 2022-04-11 RX ADMIN — HYDROMORPHONE HYDROCHLORIDE 0.5 MG: 1 INJECTION, SOLUTION INTRAMUSCULAR; INTRAVENOUS; SUBCUTANEOUS at 09:09

## 2022-04-11 RX ADMIN — HYDROMORPHONE HYDROCHLORIDE 0.5 MG: 2 INJECTION, SOLUTION INTRAMUSCULAR; INTRAVENOUS; SUBCUTANEOUS at 07:43

## 2022-04-11 RX ADMIN — HYDROMORPHONE HYDROCHLORIDE 0.5 MG: 1 INJECTION, SOLUTION INTRAMUSCULAR; INTRAVENOUS; SUBCUTANEOUS at 08:53

## 2022-04-11 NOTE — H&P
HPI  Francesca Mays is a 36 y.o. with history of   1. Nephrolithiasis         No recent fevers or new LUTS  Does not take any blood thinners    Past Medical History  Past Medical History:   Diagnosis Date   • Anxiety    • Blood clotting disorder (HCC)     superior and inferior vena cava current blood clots   • Depression    • Disease of thyroid gland    • History of angina     x15 years, sees cardiologist Q6 months at the [facility]. R/T PAT and palpitations   • Kidney stone    • Migraines    • MTHFR deficiency complicating pregnancy (HCC)    • PAT (paroxysmal atrial tachycardia) (HCC)    • Piercing     ears   • PTSD (post-traumatic stress disorder)    • Tattoo    • Wears glasses        Past Surgical History  Past Surgical History:   Procedure Laterality Date   • CARDIAC CATHETERIZATION     •  SECTION      x2   • DIAGNOSTIC LAPAROSCOPY     • EAR TUBES Bilateral    • KIDNEY SURGERY      stent in left kidney   • KNEE SURGERY Right    • SHOULDER SURGERY Right    • TONSILLECTOMY     • WISDOM TOOTH EXTRACTION         Medications    Current Facility-Administered Medications:   •  ceFAZolin Sodium-Dextrose (ANCEF) IVPB (duplex) 2 g, 2 g, Intravenous, Once, Del Billy MD  •  sodium chloride 0.9 % infusion, 100 mL/hr, Intravenous, Continuous, Del Billy MD    Allergies  Allergies   Allergen Reactions   • Elavil [Amitriptyline] Anxiety   • Percocet [Oxycodone-Acetaminophen] Hives     Can take tylenol   • Toradol [Ketorolac Tromethamine] Rash   • Tramadol Rash   • Contrast Dye Nausea And Vomiting     Pt states it also causes her to faint       Social History  Social History     Socioeconomic History   • Marital status:    Tobacco Use   • Smoking status: Current Every Day Smoker     Packs/day: 0.50     Types: Cigarettes   • Smokeless tobacco: Never Used   Vaping Use   • Vaping Use: Never used   Substance and Sexual Activity   • Alcohol use: Not Currently     Comment: rare   • Drug use:  Never   • Sexual activity: Never       Review of Systems  Constitutional: No fevers or chills  Skin: Negative for rash  Endocrine: No heat/cold intolerance   Cardiovascular: Negative for chest pain or dyspnea on exertion  Respiratory: Negative for shortness of breath or wheezing  Gastrointestinal: No constipation, nausea or vomiting  Genitourinary: Negative for new lower urinary tract symptoms, current gross hematuria or dysuria.  Musculoskeletal: No flank pain  Neurological:  Negative for frequent headaches or dizziness  Lymph/Heme: Negative for leg swelling or calf pain.    Physical Exam  Visit Vitals  /76 (BP Location: Left arm, Patient Position: Sitting)   Pulse 87   Temp 97.2 °F (36.2 °C) (Temporal)   Resp 18   SpO2 97%     Constitutional: NAD, WDWN.   HEENT: NCAT. Conjunctivae normal.  MMM.    Cardiovascular: Regular rate.  Pulmonary/Chest: Respirations are even and non-labored bilaterally.  Abdominal: Soft. No distension, tenderness, masses or guarding. No CVA tenderness.  Neurological: A + O x 3.  Cranial Nerves II-XII grossly intact. Normal gait.  Extremities: CARLIE x 4, Warm. No clubbing.  No cyanosis.    Skin: Pink, warm and dry.  No rashes noted.  Psychiatric:  Normal mood and affect    Labs & Imaging  Lab Results   Component Value Date    GLUCOSE 82 04/08/2022    CALCIUM 9.3 04/08/2022     (L) 04/08/2022    K 4.3 04/08/2022    CO2 20.2 (L) 04/08/2022     04/08/2022    BUN 11 04/08/2022    CREATININE 0.74 04/08/2022    EGFRIFAFRI >60 03/30/2022    EGFRIFNONA >60 03/30/2022    BCR 14.9 04/08/2022    ANIONGAP 9.8 04/08/2022     Lab Results   Component Value Date    WBC 10.31 04/08/2022    HGB 13.3 04/08/2022    HCT 39.7 04/08/2022    MCV 89.2 04/08/2022     04/08/2022     Brief Urine Lab Results  (Last result in the past 365 days)      Color   Clarity   Blood   Leuk Est   Nitrite   Protein   CREAT   Urine HCG        04/08/22 1447 Dark Yellow   Cloudy   Moderate (2+)   Moderate (2+)    Positive   30 mg/dL (1+)           04/08/22 1447               Positive           Urine Culture    Urine Culture 3/27/22 4/8/22   Urine Culture >100,000 CFU/mL Escherichia coli (A) 25,000 CFU/mL Mixed Thuy Isolated   (A) Abnormal value               US Ob < 14 Weeks Single or First Gestation    Result Date: 4/5/2022  This result has an attachment that is not available.    The OB Ultrasound you requested has been resulted. Please navigate to the Imaging tab in Epic for review. This message has been generated by the interface.    US Ob Transvaginal    Result Date: 3/27/2022  FINAL REPORT TECHNIQUE: Transvaginal sonographic images the pelvis were obtained. CLINICAL HISTORY: abd/flank pain, positive pregnancy test FINDINGS: There is a single intrauterine pregnancy.  The crown-rump length of 11 mm corresponds to 7 weeks 2 days gestation.  Fetal cardiac motion is identified.  The ovaries are normal with flow identified on color Doppler imaging.  There is no free fluid.     Single intrauterine pregnancy. Authenticated by Cameron Poole M.D. on 03/27/2022 08:43:37 PM    US Renal Bilateral    Result Date: 3/27/2022  FINAL REPORT TECHNIQUE: Sonographic images of the kidneys were obtained. CLINICAL HISTORY: flank pain, hx of ureteral stones, currently pregnant FINDINGS: The [id number]-[id number] lengths of the right and left kidneys are 12.5 cm and 13.3 cm respectively.  There are multiple bilateral renal cysts and multiple renal calculi are seen bilaterally.  There is mild left hydronephrosis.     Mild left hydronephrosis.  Bilateral renal calculi. Authenticated by Cameron Poole M.D. on 03/27/2022 08:43:37 PM    CT Abdomen Pelvis Stone Protocol    Result Date: 3/28/2022  FINAL REPORT TECHNIQUE: Routine axial images through the abdomen and pelvis were obtained. CLINICAL HISTORY: concern for left ureteral stone FINDINGS: Abdomen:  The gallbladder is normal.  There are bilateral renal cysts.  There is a single nonobstructing left renal calculus  and mild to moderate left hydronephrosis secondary to a 7.5 mm calculus at the UPJ (944 Hounsfield units).  The other solid abdominal organs and ureters are unremarkable.  The GI tract is unremarkable, including the appendix.  Pelvis: The uterus, ovaries and urinary bladder are normal.  There is no pelvic or abdominal ascites, adenopathy or acute osseous abnormality.     7.5 mm obstructing left UPJ stone.  Nonobstructing left renal calculus. Authenticated by Cameron Poole M.D. on 03/28/2022 12:00:55 AM          Assessment  Francesca Mays is a 36 y.o. female who presents with the following diagnosis:  1. Nephrolithiasis         Plan  1. To OR for URETEROSCOPY LASER LITHOTRIPSY WITH STENT exchange LEFT    Del Billy MD

## 2022-04-11 NOTE — ANESTHESIA PREPROCEDURE EVALUATION
Anesthesia Evaluation     Patient summary reviewed and Nursing notes reviewed   NPO Solid Status: > 8 hours  NPO Liquid Status: > 8 hours           Airway   Mallampati: II  TM distance: >3 FB  Neck ROM: full  Possible difficult intubation  Dental - normal exam     Pulmonary     breath sounds clear to auscultation  (+) a smoker Current,   Cardiovascular - negative cardio ROS  Exercise tolerance: good (4-7 METS)    ECG reviewed  Rhythm: regular  Rate: normal        Neuro/Psych  (+) headaches, psychiatric history Anxiety, Depression and PTSD,    GI/Hepatic/Renal/Endo    (+)   renal disease stones, thyroid problem     Musculoskeletal (-) negative ROS    Abdominal    Substance History - negative use     OB/GYN    (+) Pregnant,     Comment: Explained complications of prgnancy and anesthesia   Pt states she is terminating pregnancy  and wants to proceed         Other - negative ROS                     Anesthesia Plan    ASA 2     general     intravenous induction     Anesthetic plan, all risks, benefits, and alternatives have been provided, discussed and informed consent has been obtained with: patient.        CODE STATUS:

## 2022-04-11 NOTE — NURSING NOTE
Pt. Has a positive urine pregnancy from 4/8/22.  The patient and  are aware. The patient   Consents to today's procedure with Dr. Billy.

## 2022-04-11 NOTE — OP NOTE
Preoperative diagnosis  left kidney stone    Postoperative diagnosis  left kidney stone    Procedure performed  1.  Flexible cystoscopy, left retrograde pyelogram with ureteral stent exchange 6 Fr x 26 cm  2.  left ureteroscopy with holmium-YAG laser lithotripsy and basket extraction of stone fragments (40080)   3.  Fluoroscopy time < 1 hour with interpretation of images    Surgeon  Del Billy MD    Anesthesia  General    Complications  None    Specimen  Stone fragments for biochemical analysis    EBL  Minimal    Findings  Urethroscopy revealed no strictures or other abnormalities.  Cystoscopy revealed no tumors, stones or other mucosal abnormalities.  Retrograde pyelogram revealed a delicate system with identification of the stone seen on pre-op imaging.  No other filling defects and caliber of left ureter was smooth and normal.    Ureteroscopy revealed left proximal ureteral stone and renal stone compatible with CT imaging.  They were completely treated and lasered into tiny pieces of dust.     Indications  36 y.o. female agreed to undergo the above named procedure after discussion of the alternatives, risks and benefits.  Informed consent was obtained.      Procedure  The patient was taken to the operating room, identified by name and medical record number, and placed supine on the operating table.  Pre-operative antibiotics were administered.  Bilateral lower extremity SCDs were placed.  After induction of general anesthesia the patient was positioned in dorsal lithotomy, prepped and draped in a sterile fashion.  A time-out was performed.      A 14-Divehi flexible cystoscope was passed carefully via urethra into the bladder.  The left ureteral orifice was identified and a Sensor wire was passed retrograde to the level of the kidney and confirmed by fluoroscopy.  The previous indwelling stent was grasped and removed.  The flexible scope was off-loaded and the bladder emptied with a straight catheter.  A dual  lumen open-ended catheter was passed over the wire. A retrograde pyelogram was performed by slowly injecting 5 mL of 50% Omnipaque contrast via the catheter with findings described above.  An Amplatz super-stiff was placed to the level of the renal pelvis and confirmed by fluoroscopy. The dual lumen was removed. The Sensor wire was clipped to the drape as a safety wire.  A 12/14, 28-cm access sheath was advanced over the super-stiff wire to level of the UPJ under direct fluoroscopic guidance. The kidney was entered with the flexible ureteroscope.  The kidney stone was identified and fragmented with a 200-micron holmium YAG laser fiber at laser settings of 0.2 joules and a frequency of 50 Hz. The fragments were basket extracted. At the completion of the procedure, all clinically significant fragments were removed and only dust-like fragments remained.  The access sheath was removed under direct vision.  Ureteral edema but no obvious obstruction was present. A retrograde pyelogram was performed and a  6 Fr x 26 cm stent was positioned with the upper end in the kidney and the lower in the bladder confirmed by fluoroscopy. The bladder was emptied and the procedure was complete. The patient tolerated the procedure well and was stable throughout.    The patient will follow up with me next week for stent removal.

## 2022-04-11 NOTE — DISCHARGE INSTRUCTIONS
Home Care After Ureteroscopy and Laser Lithotripsy  The following instructions will help you care for yourself, or be cared for upon your return home today.    These are guidelines for your care right after surgery only.     Diet  Drink plenty of liquids and eat light meals today.    Start your regular diet tomorrow.    Activity  Start normal activities in twenty-four (24) hours.    Wound Care and Hygiene  No restrictions, start normal routine.    Anesthesia Precautions & Expectations  After anesthesia, rest for 24 hours.    Do not drive, drink alcoholic beverages or make any important decisions during this time.  General anesthesia may cause a sore throat, jaw discomfort or muscle aches.    These symptoms can last for one or two days.     What to Expect after Surgery  Mild pain with voiding.  Frequency or urgency.  Bladder cramps.  Minimal bleeding with voiding.    Call your Doctor  Passing clots in urine preventing bladder emptying  Severe pain not controlled by oral medication  Temperature above 101.5 degrees  Inability to urinate within eight (8) hours after surgery    After Stent Placement  It is common to have blood tinged urine for 3-5 days.  It is common to have pain in your side and in your back when you urinate for 3-5 days.  It is common to have urgency with urination.  This is a temporary stent and will need to be removed in the office in 1 week.  Do not take the Pyridium 24 hours prior to your stent removal.    Other Contacts  Urology Office:  [id number]   [place] [address] [phone] office  [phone] fax    Other Instructions  Take tamsulosin daily until the stent comes out.  Take oxybutynin daily as needed for bladder spasm while the stent is in.  Take Pyridium up to 3 times daily as needed for burning with urination.   Take Tylenol scheduled every 6 hours for the first 3 days.  Take the oxycodone on top of that as needed.  Take all of the antibiotics.     Follow up  Appointment  1 week for stent removal. Please See After visit Summary. Call if you do not have an appt already scheduled.   To assist you in voiding:  Drink plenty of fluids  Listen to running water while attempting to void.    If you are unable to urinate and you have an uncomfortable urge to void or it has been   6 hours since you were discharged, return to the Emergency Room     No pushing, pulling, tugging,  heavy lifting, or strenuous activity.  No major decision making, driving, or drinking alcoholic beverages for 24 hours. ( due to the medications you have  received)  Always use good hand hygiene/washing techniques.  NO driving while taking pain medications.    * if you have an incision:  Check your incision area every day for signs of infection.   Check for:  * more redness, swelling, or pain  *more fluid or blood  *warmth  *pus or bad smell

## 2022-04-11 NOTE — ANESTHESIA PROCEDURE NOTES
Airway  Urgency: elective    Date/Time: 4/11/2022 7:38 AM  Airway not difficult    General Information and Staff    Patient location during procedure: OR  CRNA: Michele Hensley CRNA    Indications and Patient Condition  Indications for airway management: CNS depression    Preoxygenated: yes  Mask difficulty assessment: 0 - not attempted    Final Airway Details  Final airway type: supraglottic airway      Successful airway: classic  Size 4    Number of attempts at approach: 1  Assessment: lips, teeth, and gum same as pre-op and atraumatic intubation

## 2022-04-11 NOTE — ANESTHESIA POSTPROCEDURE EVALUATION
Patient: Francesca Mays    Procedure Summary     Date: 04/11/22 Room / Location: [facility] FLUORO /  JENNIE OR    Anesthesia Start: 0726 Anesthesia Stop: 0827    Procedure: URETEROSCOPY retrograde LASER LITHOTRIPSY WITH STENT exchange, (Left ) Diagnosis:       Nephrolithiasis      (Nephrolithiasis [N20.0])    Surgeons: Del Billy MD Provider: Michele Hensley CRNA    Anesthesia Type: general ASA Status: 2          Anesthesia Type: general    Vitals  Vitals Value Taken Time   BP     Temp     Pulse 124 04/11/22 0827   Resp     SpO2 98 % 04/11/22 0827   Vitals shown include unvalidated device data.        Post Anesthesia Care and Evaluation    Patient location during evaluation: PACU  Patient participation: complete - patient participated  Level of consciousness: awake and alert and sleepy but conscious  Pain score: 2  Pain management: adequate  Airway patency: patent  Anesthetic complications: No anesthetic complications  PONV Status: none  Cardiovascular status: acceptable  Respiratory status: acceptable and face mask  Hydration status: acceptable

## 2022-04-12 LAB
QT INTERVAL: 372 MS
QTC INTERVAL: 412 MS

## 2022-04-15 LAB
COLOR STONE: NORMAL
COM MFR STONE: 20 %
COMPN STONE: NORMAL
HYDROXYAPATITE 24H ENGDIFF UR: 80 %
LABORATORY COMMENT REPORT: NORMAL
LABORATORY COMMENT REPORT: NORMAL
Lab: NORMAL
Lab: NORMAL
PHOTO: NORMAL
SIZE STONE: NORMAL MM
SPEC SOURCE SUBJ: NORMAL
WT STONE: 3 MG

## 2022-04-18 ENCOUNTER — OFFICE VISIT (OUTPATIENT)
Dept: UROLOGY | Facility: CLINIC | Age: 37
End: 2022-04-18

## 2022-04-18 DIAGNOSIS — N20.0 NEPHROLITHIASIS: Primary | ICD-10-CM

## 2022-04-18 PROCEDURE — 52310 CYSTOSCOPY AND TREATMENT: CPT | Performed by: UROLOGY

## 2022-04-18 NOTE — PROGRESS NOTES
Preoperative diagnosis  Foreign body in genitourinary tract    Postoperative diagnosis  Foreign body in genitourinary tract    Procedure  Flexible cystourethroscopy with stent removal    Attending surgeon  Del Billy MD    Anesthesia  2% lidocaine jelly intraurethrally    Complications  None    Indications  36 y.o. female who is status post ureteroscopy with laser lithotripsy who presents for stent removal.    Procedure  Detailed information of all possible complications and side effects were discussed with the patient.  Informed consent was obtained. Patient was given one dose of antibiotics. The patient was placed in supine position and a timeout was performed. The patient was prepped and draped in sterile fashion.  Next, 2% lidocaine jelly was bluntly injected per urethra without difficulty. The 14 Hungarian flexible cystoscope was passed through the urethra and into the bladder.  The stent was visualized, grasped and removed in its entirety.  The patient tolerated the procedure well.    Plan  1. Provided education regarding water intake of at least 2 liters per day  2. F/u in 8 weeks with a renal ultrasound

## 2022-04-19 ENCOUNTER — TELEPHONE (OUTPATIENT)
Dept: UROLOGY | Facility: CLINIC | Age: 37
End: 2022-04-19

## 2022-04-19 NOTE — TELEPHONE ENCOUNTER
Caller: KATE NICHOLAS    Relationship: SELF    Best call back number: [phone]    What form or medical record are you requesting: [employer]    Who is requesting this form or medical record from you: PATIENT    How would you like to receive the form or medical records (pick-up, mail, fax):     Timeframe paperwork needed: ASAP    Additional notes:MAKE SURE THAT YOUR NAME SAYS KATE FLORES BECAUSE OF [facility] HAS KATE NICHOLAS

## 2022-04-19 NOTE — TELEPHONE ENCOUNTER
Caller: Francesca Nicholas    Relationship: Self    Best call back number: [phone]    What form or medical record are you requesting: MEDICAL RECORDS FOR [employer] NEEDED IN ORDER FOR THEM PAY. [employer] COVERS CRITICAL ILLNESS AND ACCIDENTALLY INSURANCE. PT NAME THROUGH [employer] IS LISTED UNDER HER  NAME FRANCESCA FLORES NOT HER MAIDEN NAME FRANCESCA NICHOLAS.     Who is requesting this form or medical record from you: PT    How would you like to receive the form or medical records (pick-up, mail, fax):   If pick-up, provide patient with address and location details  PT WOULD LIKE TO PICK IT UP IN OFFICE  Timeframe paperwork needed:AS SOON AS POSSIBLE    Additional notes: [employer] # [phone]

## 2022-05-02 ENCOUNTER — HOSPITAL ENCOUNTER (EMERGENCY)
Facility: HOSPITAL | Age: 37
Discharge: LEFT BEFORE DISCHARGE | End: 2022-05-02
Attending: EMERGENCY MEDICINE | Admitting: EMERGENCY MEDICINE

## 2022-05-02 ENCOUNTER — APPOINTMENT (OUTPATIENT)
Dept: ULTRASOUND IMAGING | Facility: HOSPITAL | Age: 37
End: 2022-05-02

## 2022-05-02 VITALS
HEIGHT: 66 IN | WEIGHT: 186.4 LBS | BODY MASS INDEX: 29.96 KG/M2 | OXYGEN SATURATION: 97 % | SYSTOLIC BLOOD PRESSURE: 132 MMHG | HEART RATE: 88 BPM | TEMPERATURE: 99.1 F | RESPIRATION RATE: 16 BRPM | DIASTOLIC BLOOD PRESSURE: 72 MMHG

## 2022-05-02 DIAGNOSIS — I82.4Y2 ACUTE DEEP VEIN THROMBOSIS (DVT) OF PROXIMAL VEIN OF LEFT LOWER EXTREMITY: Primary | ICD-10-CM

## 2022-05-02 PROCEDURE — 93971 EXTREMITY STUDY: CPT

## 2022-05-02 PROCEDURE — 99282 EMERGENCY DEPT VISIT SF MDM: CPT

## 2022-05-02 RX ORDER — SODIUM CHLORIDE 0.9 % (FLUSH) 0.9 %
10 SYRINGE (ML) INJECTION AS NEEDED
Status: DISCONTINUED | OUTPATIENT
Start: 2022-05-02 | End: 2022-05-03 | Stop reason: HOSPADM

## 2022-05-03 ENCOUNTER — HOSPITAL ENCOUNTER (INPATIENT)
Facility: HOSPITAL | Age: 37
LOS: 1 days | Discharge: LEFT BEFORE DISCHARGE | End: 2022-05-03
Attending: EMERGENCY MEDICINE | Admitting: INTERNAL MEDICINE

## 2022-05-03 VITALS
BODY MASS INDEX: 29.57 KG/M2 | WEIGHT: 184 LBS | DIASTOLIC BLOOD PRESSURE: 76 MMHG | TEMPERATURE: 98.6 F | RESPIRATION RATE: 18 BRPM | HEIGHT: 66 IN | OXYGEN SATURATION: 96 % | SYSTOLIC BLOOD PRESSURE: 115 MMHG | HEART RATE: 98 BPM

## 2022-05-03 DIAGNOSIS — I82.402 ACUTE DEEP VEIN THROMBOSIS (DVT) OF LEFT LOWER EXTREMITY, UNSPECIFIED VEIN: Primary | ICD-10-CM

## 2022-05-03 PROBLEM — Z15.89 MTHFR GENE MUTATION: Status: ACTIVE | Noted: 2022-05-03

## 2022-05-03 PROBLEM — Z86.718 HISTORY OF DVT (DEEP VEIN THROMBOSIS): Status: ACTIVE | Noted: 2022-05-03

## 2022-05-03 PROBLEM — I47.1 PAT (PAROXYSMAL ATRIAL TACHYCARDIA) (HCC): Status: ACTIVE | Noted: 2022-05-03

## 2022-05-03 PROBLEM — Z72.0 TOBACCO ABUSE: Status: ACTIVE | Noted: 2022-05-03

## 2022-05-03 PROBLEM — I47.19 PAT (PAROXYSMAL ATRIAL TACHYCARDIA): Status: ACTIVE | Noted: 2022-05-03

## 2022-05-03 PROBLEM — F41.9 ANXIETY DISORDER: Status: ACTIVE | Noted: 2022-05-03

## 2022-05-03 LAB
ALBUMIN SERPL-MCNC: 4.1 G/DL (ref 3.5–5.2)
ALBUMIN/GLOB SERPL: 1.6 G/DL
ALP SERPL-CCNC: 87 U/L (ref 39–117)
ALT SERPL W P-5'-P-CCNC: 17 U/L (ref 1–33)
ANION GAP SERPL CALCULATED.3IONS-SCNC: 11 MMOL/L (ref 5–15)
APTT PPP: 27.5 SECONDS (ref 60–90)
AST SERPL-CCNC: 20 U/L (ref 1–32)
BASOPHILS # BLD AUTO: 0.06 10*3/MM3 (ref 0–0.2)
BASOPHILS NFR BLD AUTO: 0.8 % (ref 0–1.5)
BILIRUB SERPL-MCNC: 0.2 MG/DL (ref 0–1.2)
BUN SERPL-MCNC: 10 MG/DL (ref 6–20)
BUN/CREAT SERPL: 15.4 (ref 7–25)
CALCIUM SPEC-SCNC: 9.4 MG/DL (ref 8.6–10.5)
CHLORIDE SERPL-SCNC: 108 MMOL/L (ref 98–107)
CO2 SERPL-SCNC: 21 MMOL/L (ref 22–29)
CREAT SERPL-MCNC: 0.65 MG/DL (ref 0.57–1)
DEPRECATED RDW RBC AUTO: 42.5 FL (ref 37–54)
EGFRCR SERPLBLD CKD-EPI 2021: 117.9 ML/MIN/1.73
EOSINOPHIL # BLD AUTO: 0.24 10*3/MM3 (ref 0–0.4)
EOSINOPHIL NFR BLD AUTO: 3.3 % (ref 0.3–6.2)
ERYTHROCYTE [DISTWIDTH] IN BLOOD BY AUTOMATED COUNT: 13.3 % (ref 12.3–15.4)
GLOBULIN UR ELPH-MCNC: 2.6 GM/DL
GLUCOSE SERPL-MCNC: 97 MG/DL (ref 65–99)
HCT VFR BLD AUTO: 37.1 % (ref 34–46.6)
HGB BLD-MCNC: 12.5 G/DL (ref 12–15.9)
IMM GRANULOCYTES # BLD AUTO: 0.02 10*3/MM3 (ref 0–0.05)
IMM GRANULOCYTES NFR BLD AUTO: 0.3 % (ref 0–0.5)
INR PPP: 1.04 (ref 0.84–1.13)
LYMPHOCYTES # BLD AUTO: 2.18 10*3/MM3 (ref 0.7–3.1)
LYMPHOCYTES NFR BLD AUTO: 30 % (ref 19.6–45.3)
MCH RBC QN AUTO: 29.6 PG (ref 26.6–33)
MCHC RBC AUTO-ENTMCNC: 33.7 G/DL (ref 31.5–35.7)
MCV RBC AUTO: 87.9 FL (ref 79–97)
MONOCYTES # BLD AUTO: 0.45 10*3/MM3 (ref 0.1–0.9)
MONOCYTES NFR BLD AUTO: 6.2 % (ref 5–12)
NEUTROPHILS NFR BLD AUTO: 4.31 10*3/MM3 (ref 1.7–7)
NEUTROPHILS NFR BLD AUTO: 59.4 % (ref 42.7–76)
NRBC BLD AUTO-RTO: 0 /100 WBC (ref 0–0.2)
PLATELET # BLD AUTO: 319 10*3/MM3 (ref 140–450)
PMV BLD AUTO: 9.6 FL (ref 6–12)
POTASSIUM SERPL-SCNC: 3.9 MMOL/L (ref 3.5–5.2)
PROT SERPL-MCNC: 6.7 G/DL (ref 6–8.5)
PROTHROMBIN TIME: 13.5 SECONDS (ref 11.4–14.4)
RBC # BLD AUTO: 4.22 10*6/MM3 (ref 3.77–5.28)
SODIUM SERPL-SCNC: 140 MMOL/L (ref 136–145)
UFH PPP CHRO-ACNC: 0.1 IU/ML (ref 0.3–0.7)
WBC NRBC COR # BLD: 7.26 10*3/MM3 (ref 3.4–10.8)

## 2022-05-03 PROCEDURE — 85025 COMPLETE CBC W/AUTO DIFF WBC: CPT | Performed by: NURSE PRACTITIONER

## 2022-05-03 PROCEDURE — 80053 COMPREHEN METABOLIC PANEL: CPT | Performed by: NURSE PRACTITIONER

## 2022-05-03 PROCEDURE — 85520 HEPARIN ASSAY: CPT | Performed by: NURSE PRACTITIONER

## 2022-05-03 PROCEDURE — 85610 PROTHROMBIN TIME: CPT | Performed by: NURSE PRACTITIONER

## 2022-05-03 PROCEDURE — 85730 THROMBOPLASTIN TIME PARTIAL: CPT | Performed by: NURSE PRACTITIONER

## 2022-05-03 PROCEDURE — 99223 1ST HOSP IP/OBS HIGH 75: CPT | Performed by: INTERNAL MEDICINE

## 2022-05-03 PROCEDURE — 99283 EMERGENCY DEPT VISIT LOW MDM: CPT

## 2022-05-03 PROCEDURE — 25010000002 HEPARIN (PORCINE) PER 1000 UNITS

## 2022-05-03 PROCEDURE — 96374 THER/PROPH/DIAG INJ IV PUSH: CPT

## 2022-05-03 RX ORDER — POLYETHYLENE GLYCOL 3350 17 G/17G
17 POWDER, FOR SOLUTION ORAL DAILY PRN
Status: CANCELLED | OUTPATIENT
Start: 2022-05-03

## 2022-05-03 RX ORDER — HEPARIN SODIUM 1000 [USP'U]/ML
7000 INJECTION, SOLUTION INTRAVENOUS; SUBCUTANEOUS ONCE
Status: COMPLETED | OUTPATIENT
Start: 2022-05-03 | End: 2022-05-03

## 2022-05-03 RX ORDER — SODIUM CHLORIDE 0.9 % (FLUSH) 0.9 %
10 SYRINGE (ML) INJECTION EVERY 12 HOURS SCHEDULED
Status: CANCELLED | OUTPATIENT
Start: 2022-05-03

## 2022-05-03 RX ORDER — OXYCODONE HYDROCHLORIDE 5 MG/1
5 TABLET ORAL EVERY 6 HOURS PRN
Status: CANCELLED | OUTPATIENT
Start: 2022-05-03

## 2022-05-03 RX ORDER — METHOCARBAMOL 500 MG/1
500 TABLET, FILM COATED ORAL EVERY 6 HOURS PRN
Status: CANCELLED | OUTPATIENT
Start: 2022-05-03

## 2022-05-03 RX ORDER — FUROSEMIDE 20 MG/1
10 TABLET ORAL DAILY
Status: CANCELLED | OUTPATIENT
Start: 2022-05-03

## 2022-05-03 RX ORDER — SODIUM CHLORIDE 0.9 % (FLUSH) 0.9 %
10 SYRINGE (ML) INJECTION AS NEEDED
Status: CANCELLED | OUTPATIENT
Start: 2022-05-03

## 2022-05-03 RX ORDER — SODIUM CHLORIDE 0.9 % (FLUSH) 0.9 %
10 SYRINGE (ML) INJECTION AS NEEDED
Status: DISCONTINUED | OUTPATIENT
Start: 2022-05-03 | End: 2022-05-03 | Stop reason: HOSPADM

## 2022-05-03 RX ORDER — TAMSULOSIN HYDROCHLORIDE 0.4 MG/1
0.4 CAPSULE ORAL NIGHTLY
Status: CANCELLED | OUTPATIENT
Start: 2022-05-03

## 2022-05-03 RX ORDER — ACETAMINOPHEN 325 MG/1
650 TABLET ORAL EVERY 4 HOURS PRN
Status: CANCELLED | OUTPATIENT
Start: 2022-05-03

## 2022-05-03 RX ORDER — FAMOTIDINE 20 MG/1
40 TABLET, FILM COATED ORAL DAILY
Status: CANCELLED | OUTPATIENT
Start: 2022-05-03

## 2022-05-03 RX ORDER — HEPARIN SOD,PORCINE/0.9 % NACL 25000/250
18 INTRAVENOUS SOLUTION INTRAVENOUS
Status: DISCONTINUED | OUTPATIENT
Start: 2022-05-03 | End: 2022-05-03 | Stop reason: HOSPADM

## 2022-05-03 RX ORDER — OXYBUTYNIN CHLORIDE 10 MG/1
10 TABLET, EXTENDED RELEASE ORAL DAILY
Status: CANCELLED | OUTPATIENT
Start: 2022-05-03

## 2022-05-03 RX ORDER — ONDANSETRON 2 MG/ML
4 INJECTION INTRAMUSCULAR; INTRAVENOUS EVERY 6 HOURS PRN
Status: CANCELLED | OUTPATIENT
Start: 2022-05-03

## 2022-05-03 RX ORDER — DOCUSATE SODIUM 100 MG/1
100 CAPSULE, LIQUID FILLED ORAL DAILY
Status: CANCELLED | OUTPATIENT
Start: 2022-05-03

## 2022-05-03 RX ORDER — GABAPENTIN 100 MG/1
300 CAPSULE ORAL NIGHTLY
Status: CANCELLED | OUTPATIENT
Start: 2022-05-03

## 2022-05-03 RX ORDER — PROPRANOLOL HYDROCHLORIDE 20 MG/1
60 TABLET ORAL DAILY
Status: CANCELLED | OUTPATIENT
Start: 2022-05-03

## 2022-05-03 RX ADMIN — HEPARIN SODIUM 7000 UNITS: 1000 INJECTION INTRAVENOUS; SUBCUTANEOUS at 15:37

## 2022-05-03 RX ADMIN — HEPARIN SODIUM 18 UNITS/KG/HR: 5000 INJECTION, SOLUTION INTRAVENOUS; SUBCUTANEOUS at 14:38

## 2022-05-03 NOTE — ED NOTES
Hospitalist, Dr. Pineda at [facility] paged for JASON Payne for possible transfer.

## 2022-05-03 NOTE — H&P
[facility] Medicine Services  HISTORY AND PHYSICAL    Patient Name: Francesca Mays  : 1985  MRN: [id number]  Primary Care Physician: Kacy Moise MD  Date of admission: 5/3/2022    Subjective   Subjective     Chief Complaint:  Leg swelling/ pain    HPI:  Francesca Mays is a 37 y.o. 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 Patient reports swelling increased and causing pain to touch and some while ambulating. Noticed sore starting on left foot due to swelling and pressure on shoe. Diagnosed with extensive LLE DVT on  and was to be admitted with transfer to [facility], but signed out AMA due to disagreement with RN in ED yesterday. Today presents with same issues and to be admitted. Denies soa/ MYERS/ dizziness.        Review of Systems   Constitutional: Positive for activity change.   HENT: Negative.    Eyes: Negative.    Respiratory: Negative.    Cardiovascular: Positive for leg swelling.   Gastrointestinal: Negative.    Endocrine: Negative.    Genitourinary: Negative.    Musculoskeletal: Positive for gait problem and myalgias.   Skin: Negative.    Hematological: Negative.    Psychiatric/Behavioral: Negative.         All other systems reviewed and are negative.     Personal History     Past Medical History:   Diagnosis Date   • Anxiety    • Blood clotting disorder (HCC)     superior and inferior vena cava current blood clots   • Depression    • Disease of thyroid gland    • History of angina     x15 years, sees cardiologist Q6 months at the [facility]. R/T PAT and palpitations   • Kidney stone    • Migraines    • MTHFR deficiency complicating pregnancy (HCC)    • PAT (paroxysmal atrial tachycardia) ([facility])    • Piercing     ears   • PTSD (post-traumatic stress disorder)    • Tattoo    • Wears  glasses              Past Surgical History:   Procedure Laterality Date   • CARDIAC CATHETERIZATION     •  SECTION      x2   • DIAGNOSTIC LAPAROSCOPY     • EAR TUBES Bilateral    • KIDNEY SURGERY      stent in left kidney   • KNEE SURGERY Right    • SHOULDER SURGERY Right    • TONSILLECTOMY     • URETEROSCOPY LASER LITHOTRIPSY WITH STENT INSERTION Left 2022    Procedure: URETEROSCOPY retrograde LASER LITHOTRIPSY WITH STENT exchange,;  Surgeon: Del Billy MD;  Location: [facility];  Service: Urology;  Laterality: Left;   • WISDOM TOOTH EXTRACTION          Family History:  family history includes Clotting disorder in her father; Heart disease in her mother. Otherwise pertinent FHx was reviewed and unremarkable.     Social History:  reports that she has been smoking cigarettes. She has been smoking about 0.50 packs per day. She has never used smokeless tobacco. She reports previous alcohol use. She reports that she does not use drugs.  Social History     Social History Narrative   • Not on file       Medications:  docusate sodium, enoxaparin, furosemide, gabapentin, ibuprofen, methocarbamol, ondansetron, oxyCODONE, oxybutynin XL, phenazopyridine, propranolol, and tamsulosin    Allergies   Allergen Reactions   • Elavil [Amitriptyline] Anxiety   • Percocet [Oxycodone-Acetaminophen] Hives     Can take tylenol   • Toradol [Ketorolac Tromethamine] Rash   • Tramadol Rash   • Contrast Dye Nausea And Vomiting     Pt states it also causes her to faint       Objective   Objective     Vital Signs:   Temp:  [98.6 °F (37 °C)] 98.6 °F (37 °C)  Heart Rate:  [98] 98  Resp:  [18] 18  BP: (115-135)/(76-81) 115/76    Physical Exam   Constitutional: Awake, alert, sitting up in bed with children at bedside  Eyes: PERRLA, sclerae anicteric, no conjunctival injection  HENT: NCAT, mucous membranes moist  Neck: Supple, no thyromegaly, no lymphadenopathy, trachea midline  Respiratory: Clear to auscultation bilaterally,  nonlabored respirations   Cardiovascular: RRR, no murmurs, rubs, or gallops, palpable pedal pulses bilaterally  Gastrointestinal: Positive bowel sounds, soft, nontender, nondistended  Musculoskeletal: LLE edema 2+, no cording, TTP along calf and shin  Psychiatric: Appropriate affect, cooperative  Neurologic: Oriented x 3, strength symmetric in all extremities, Cranial Nerves grossly intact to confrontation, speech clear  Skin: No rashes      Results Reviewed:  I have personally reviewed most recent indicated data and agree with findings including:  [x]  Laboratory  [x]  Radiology  []  EKG/Telemetry  []  Pathology  []  Cardiac/Vascular Studies  [x]  Old records  []  Other:  Most pertinent findings include:      LAB RESULTS:      Lab 05/03/22  1411   WBC 7.26   HEMOGLOBIN 12.5   HEMATOCRIT 37.1   PLATELETS 319   NEUTROS ABS 4.31   IMMATURE GRANS (ABS) 0.02   LYMPHS ABS 2.18   MONOS ABS 0.45   EOS ABS 0.24   MCV 87.9   PROTIME 13.5   APTT 27.5*   HEPARIN ANTI-XA 0.10*         Lab 05/03/22  1411   SODIUM 140   POTASSIUM 3.9   CHLORIDE 108*   CO2 21.0*   ANION GAP 11.0   BUN 10   CREATININE 0.65   EGFR 117.9   GLUCOSE 97   CALCIUM 9.4         Lab 05/03/22  1411   TOTAL PROTEIN 6.7   ALBUMIN 4.10   GLOBULIN 2.6   ALT (SGPT) 17   AST (SGOT) 20   BILIRUBIN 0.2   ALK PHOS 87         Lab 05/03/22  1411   PROTIME 13.5   INR 1.04                 Brief Urine Lab Results  (Last result in the past 365 days)      Color   Clarity   Blood   Leuk Est   Nitrite   Protein   CREAT   Urine HCG        04/08/22 1447 Dark Yellow   Cloudy   Moderate (2+)   Moderate (2+)   Positive   30 mg/dL (1+)           04/08/22 1447               Positive           Microbiology Results (last 10 days)     ** No results found for the last 240 hours. **          US Venous Doppler Lower Extremity Left (duplex)    Result Date: 5/2/2022  FINAL REPORT TECHNIQUE: Multiple transverse and longitudinal images were performed of the deep venous system with  augmentation and compression maneuvers. CLINICAL HISTORY: pain and swelling. hx of DVT. FINDINGS: There is extensive echogenic thrombus and noncompressibility present throughout the left lower extremity deep veins.     Impression: Positive extensive DVT. Authenticated by Flash Ross MD on 05/02/2022 10:42:41 PM      Results for orders placed during the hospital encounter of 08/30/20    Adult Transthoracic Echo Complete W/ Cont if Necessary Per Protocol    Interpretation Summary  · Estimated EF = 65%.  · Left ventricular systolic function is normal.  · Calculated right ventricular systolic pressure from tricuspid regurgitation is 19 mmHg.      Assessment/Plan   Assessment & Plan       Acute deep vein thrombosis (DVT) of left lower extremity, unspecified vein (HCC)    MTHFR gene mutation    History of DVT (deep vein thrombosis)    Anxiety disorder    PAT (paroxysmal atrial tachycardia) (HCC)    Tobacco abuse      Acute LLE DVT  -- duplex reviewed from 5/2: extensive DVT involving all deep veins LLE  -- history of MTHFR [facility] and [facility]. Failed coumadin and was previously deemed no NOAC candidate. Has been on Lovenox x 10years.   -- Will place orders for hypercoag work up  -- Hematology and vascular surgery consulted for am  -- heparin bolus given in ED, continue on gtt  -- continue home pain medication as needed    Pressure wound left foot  -- from edema/ pressure of shoe  -- WOC    PAT  -- propanolol    Chronic pain  -- continue gabapentin, oxycodone prn    Recent history of retention/ kidney stones  -- flomax, oxybutynin    Tobacco abuse  -- cessation advised  -- declines patch    DVT prophylaxis:  Heparin Gtt    CODE STATUS:   Code Status (Patient has no pulse and is not breathing): CPR (Attempt to Resuscitate)  Medical Interventions (Patient has pulse or is breathing): Full Support      This note has been completed as part of a split-shared workflow.        Electronically signed by Beth Hanley, TAN, 05/03/22, 4:49 PM EDT.      Attending   Admission Attestation       I have seen and examined the patient, performing an independent face-to-face diagnostic evaluation with plan of care reviewed and developed with the advanced practice clinician (APC).      Brief Summary Statement:   Francesca Mays is a 37 y.o. female who states she has history of LLE VTE and has tried to take several oral anticoagulants in the past, but they have been discontinued by her physicians for various reasons. She currently has daily SC Lovenox managed by her PCP. On Friday, she noticed increased pain and swelling in the left calf with some warmth on the posterior aspect. She had an ultrasound done in [place] where she was seen/evaluated and this confirmed recurrence of VTE. She was set to be admitted there but preferred to leave. She came to the ED today (Tuesday) due to continued symptoms. She denies fever/chills, nausea/emesis, chest pain, focal neurologic deficit, bowel habit change, LE trauma, long travel via plane or auto, or syncope.     Remainder of detailed HPI is as noted by APC and has been reviewed and/or edited by me for completeness.    Attending Physical Exam:  Constitutional: Awake, alert  Eyes: PERRLA, sclerae anicteric, no conjunctival injection  HENT: NCAT, mucous membranes moist  Neck: Supple, no thyromegaly, no lymphadenopathy, trachea midline  Respiratory: Clear to auscultation bilaterally, nonlabored respirations   Cardiovascular: RRR, no murmurs, rubs, or gallops, palpable pedal pulses bilaterally  Gastrointestinal: Positive bowel sounds, soft, nontender, nondistended  Musculoskeletal: No bilateral ankle edema, no clubbing or cyanosis to extremities; LLE has mildly swollen calf, pain on palpation, no cord/mass, mild increased warmth on palpation.  Psychiatric: Appropriate affect, cooperative  Neurologic: Oriented x 3, strength symmetric in all extremities,  Cranial Nerves grossly intact to confrontation, speech clear  Skin: No rashes    Brief Assessment/Plan :  See detailed assessment and plan developed with APC which I have reviewed and/or edited for completeness.        Admission Status: I believe that this patient meets inpatient criteria due to need for IV heparin and consults with hematology and Vasc. Surgery services.      Oscar English,MARILEE, DO  05/03/22

## 2022-05-03 NOTE — NURSING NOTE
Pt arrived to unit at 1700 with 2 young children. Pt decided to leave AMA when informed of the visitation policy. See Charge RN note for details.

## 2022-05-03 NOTE — ED PROVIDER NOTES
Subjective   Francesca Mays is a 37 yr old female that presents emergency department for complaints of left lower extremity DVT.  Patient advises that she has a history of a DVT in her left lower extremity.  Patient reports she has been on Lovenox twice a day for 10 years.  Patient advises that on Friday she noticed increasing swelling to her left lower extremity.  Patient reports the muscle tight cramping in her left posterior calf.  She noticed heat to the area.  Patient contacted her PCP and had an ultrasound of her left lower leg yesterday.  Patient was informed that she had an extensive left lower extremity DVT.  She denies any chest pain or shortness of breath.  Negative for fever, chills.  Patient is able to ambulate.      History provided by:  Patient   used: No    Leg Swelling  Location:  Lt lower leg pain and swelling Lt calf   Severity:  Moderate  Timing:  Constant  Progression:  Worsening  Worsened by:  ROM, ambulation  Associated symptoms: no chest pain, no fever, no nausea, no shortness of breath and no vomiting        Review of Systems   Constitutional: Negative for chills and fever.   Respiratory: Negative for shortness of breath.    Cardiovascular: Negative for chest pain.   Gastrointestinal: Negative for nausea and vomiting.   Musculoskeletal:        Lt leg pain swelling   Neurological: Negative for weakness and numbness.   All other systems reviewed and are negative.      Past Medical History:   Diagnosis Date   • Anxiety    • Blood clotting disorder (HCC)     superior and inferior vena cava current blood clots   • Depression    • Disease of thyroid gland    • History of angina     x15 years, sees cardiologist Q6 months at the [facility]. R/T PAT and palpitations   • Kidney stone    • Migraines    • MTHFR deficiency complicating pregnancy ([facility])    • PAT (paroxysmal atrial tachycardia) ([facility])    • Piercing     ears   • PTSD (post-traumatic stress disorder)    • Tattoo    •  Wears glasses        Allergies   Allergen Reactions   • Elavil [Amitriptyline] Anxiety   • Percocet [Oxycodone-Acetaminophen] Hives     Can take tylenol   • Toradol [Ketorolac Tromethamine] Rash   • Tramadol Rash   • Contrast Dye Nausea And Vomiting     Pt states it also causes her to faint       Past Surgical History:   Procedure Laterality Date   • CARDIAC CATHETERIZATION     •  SECTION      x2   • DIAGNOSTIC LAPAROSCOPY     • EAR TUBES Bilateral    • KIDNEY SURGERY      stent in left kidney   • KNEE SURGERY Right    • SHOULDER SURGERY Right    • TONSILLECTOMY     • URETEROSCOPY LASER LITHOTRIPSY WITH STENT INSERTION Left 2022    Procedure: URETEROSCOPY retrograde LASER LITHOTRIPSY WITH STENT exchange,;  Surgeon: Del Billy MD;  Location: [facility];  Service: Urology;  Laterality: Left;   • WISDOM TOOTH EXTRACTION         No family history on file.    Social History     Socioeconomic History   • Marital status:    Tobacco Use   • Smoking status: Current Every Day Smoker     Packs/day: 0.50     Types: Cigarettes   • Smokeless tobacco: Never Used   Vaping Use   • Vaping Use: Never used   Substance and Sexual Activity   • Alcohol use: Not Currently     Comment: rare   • Drug use: Never   • Sexual activity: Never           Objective   Physical Exam  Vitals and nursing note reviewed.   Constitutional:       Appearance: Normal appearance. She is well-developed. She is not toxic-appearing.   HENT:      Head: Normocephalic and atraumatic.      Nose: Nose normal.      Mouth/Throat:      Mouth: Mucous membranes are moist.   Eyes:      General: Lids are normal.      Extraocular Movements: Extraocular movements intact.      Conjunctiva/sclera: Conjunctivae normal.      Pupils: Pupils are equal, round, and reactive to light.   Neck:      Trachea: Trachea normal.   Cardiovascular:      Rate and Rhythm: Regular rhythm.      Pulses: Normal pulses.      Heart sounds: Normal heart sounds.   Pulmonary:       Effort: Pulmonary effort is normal. No respiratory distress.      Breath sounds: Normal breath sounds. No decreased breath sounds, wheezing, rhonchi or rales.   Abdominal:      General: Bowel sounds are normal.      Palpations: Abdomen is soft.      Tenderness: There is no abdominal tenderness.   Musculoskeletal:      Cervical back: Full passive range of motion without pain and normal range of motion.      Left lower leg: Swelling (calf) and tenderness (posterior calf) present.   Skin:     General: Skin is warm and dry.      Findings: No rash.   Neurological:      Mental Status: She is alert and oriented to person, place, and time.      Cranial Nerves: No cranial nerve deficit.   Psychiatric:         Speech: Speech normal.         Behavior: Behavior normal. Behavior is cooperative.         Procedures           ED Course  ED Course as of 05/03/22 1613   Tue May 03, 2022   1552 CLINICAL HISTORY:  pain and swelling. hx of DVT.     FINDINGS:  There is extensive echogenic thrombus and noncompressibility  present throughout the left lower extremity deep veins.     IMPRESSION:  Positive extensive DVT.     Authenticated by Flash Ross MD on 05/02/2022 10:42:41 PM    Dr. English was contacted.  He agrees to admit the patient. [KG]      ED Course User Index  [KG] Danuta Ko, TAN                Recent Results (from the past 24 hour(s))   Comprehensive Metabolic Panel    Collection Time: 05/03/22  2:11 PM    Specimen: Blood   Result Value Ref Range    Glucose 97 65 - 99 mg/dL    BUN 10 6 - 20 mg/dL    Creatinine 0.65 0.57 - 1.00 mg/dL    Sodium 140 136 - 145 mmol/L    Potassium 3.9 3.5 - 5.2 mmol/L    Chloride 108 (H) 98 - 107 mmol/L    CO2 21.0 (L) 22.0 - 29.0 mmol/L    Calcium 9.4 8.6 - 10.5 mg/dL    Total Protein 6.7 6.0 - 8.5 g/dL    Albumin 4.10 3.50 - 5.20 g/dL    ALT (SGPT) 17 1 - 33 U/L    AST (SGOT) 20 1 - 32 U/L    Alkaline Phosphatase 87 39 - 117 U/L    Total Bilirubin 0.2 0.0 - 1.2 mg/dL    Globulin  2.6 gm/dL    A/G Ratio 1.6 g/dL    BUN/Creatinine Ratio 15.4 7.0 - 25.0    Anion Gap 11.0 5.0 - 15.0 mmol/L    eGFR 117.9 >60.0 mL/min/1.73   Protime-INR    Collection Time: 05/03/22  2:11 PM    Specimen: Blood   Result Value Ref Range    Protime 13.5 11.4 - 14.4 Seconds    INR 1.04 0.84 - 1.13   CBC Auto Differential    Collection Time: 05/03/22  2:11 PM    Specimen: Blood   Result Value Ref Range    WBC 7.26 3.40 - 10.80 10*3/mm3    RBC 4.22 3.77 - 5.28 10*6/mm3    Hemoglobin 12.5 12.0 - 15.9 g/dL    Hematocrit 37.1 34.0 - 46.6 %    MCV 87.9 79.0 - 97.0 fL    MCH 29.6 26.6 - 33.0 pg    MCHC 33.7 31.5 - 35.7 g/dL    RDW 13.3 12.3 - 15.4 %    RDW-SD 42.5 37.0 - 54.0 fl    MPV 9.6 6.0 - 12.0 fL    Platelets 319 140 - 450 10*3/mm3    Neutrophil % 59.4 42.7 - 76.0 %    Lymphocyte % 30.0 19.6 - 45.3 %    Monocyte % 6.2 5.0 - 12.0 %    Eosinophil % 3.3 0.3 - 6.2 %    Basophil % 0.8 0.0 - 1.5 %    Immature Grans % 0.3 0.0 - 0.5 %    Neutrophils, Absolute 4.31 1.70 - 7.00 10*3/mm3    Lymphocytes, Absolute 2.18 0.70 - 3.10 10*3/mm3    Monocytes, Absolute 0.45 0.10 - 0.90 10*3/mm3    Eosinophils, Absolute 0.24 0.00 - 0.40 10*3/mm3    Basophils, Absolute 0.06 0.00 - 0.20 10*3/mm3    Immature Grans, Absolute 0.02 0.00 - 0.05 10*3/mm3    nRBC 0.0 0.0 - 0.2 /100 WBC   Heparin Anti-Xa    Collection Time: 05/03/22  2:11 PM    Specimen: Blood   Result Value Ref Range    Heparin Anti-Xa (UFH) 0.10 (L) 0.30 - 0.70 IU/ml   aPTT    Collection Time: 05/03/22  2:11 PM    Specimen: Blood   Result Value Ref Range    PTT 27.5 (L) 60.0 - 90.0 seconds     Note: In addition to lab results from this visit, the labs listed above may include labs taken at another facility or during a different encounter within the last 24 hours. Please correlate lab times with ED admission and discharge times for further clarification of the services performed during this visit.    No orders to display     Vitals:    05/03/22 1230   BP: 135/81   Pulse: 98   Resp:  "18   Temp: 98.6 °F (37 °C)   TempSrc: Oral   SpO2: 98%   Weight: 83.5 kg (184 lb)   Height: 167.6 cm (66\")     Medications   sodium chloride 0.9 % flush 10 mL (has no administration in time range)   Pharmacy to Dose Heparin (has no administration in time range)   heparin 93340 units/250 mL (100 units/mL) in 0.9% NaCl infusion (18 Units/kg/hr × 83.5 kg Intravenous New Bag 5/3/22 1438)   heparin (porcine) injection 7,000 Units (7,000 Units Intravenous Given 5/3/22 1537)     ECG/EMG Results (last 24 hours)     ** No results found for the last 24 hours. **        No orders to display                                        MDM    Final diagnoses:   Acute deep vein thrombosis (DVT) of left lower extremity, unspecified vein (HCC)       ED Disposition  ED Disposition     ED Disposition   Decision to Admit    Condition   --    Comment   Level of Care: Telemetry [5]   Diagnosis: Acute deep vein thrombosis (DVT) of left lower extremity, unspecified vein (HCC) [[id number]]   Admitting Physician: BEN CARRILLO III [[id number]]   Attending Physician: BEN CARRILLO III [[id number]]   Isolate for COVID?: No [0]   Bed Request Comments: inpt tele   Certification: I Certify That Inpatient Hospital Services Are Medically Necessary For Greater Than 2 Midnights               No follow-up provider specified.       Medication List      No changes were made to your prescriptions during this visit.          Danuta Ko, APRN  05/03/22 1613    "

## 2022-05-03 NOTE — SIGNIFICANT NOTE
"Pt came to unit with 2 young minor children.  Pt states ED RN stated children can stay overnight with pt as long as they stay in room.  Charge RN, notified [facility], no exceptions made.  Pt states will not stay without children at bedside.  Charge RN attempted to give options of either having someone come  children or we could reach out to SW for help, pt cut off nurse stating, \"Nope, my kids come first.\"  Charge RN attempted to explain the importance of treatment, pt began speaking on the phone to someone.  Charge RN notified MD and [facility].  Pt's  called threatening staff with lawsuits and screaming into phone.  Charge RN ended conversation and notified [facility].    "

## 2022-05-03 NOTE — ED PROVIDER NOTES
Subjective   Chief Complaint: Left calf and popliteal pain  History of Present Illness: 37-year-old female with a history of MTHFR on Lovenox shots twice a day for the past 10 years comes in for evaluation above complaint.  She states Friday she noticed some pain in her left popliteal area and calf and has noticed swelling of the left calf since.  No chest pain or shortness of breath.  No other complaints.  No missed doses of Lovenox.  Onset: Friday night  Timing: Ongoing, worsening  Exacerbating / Alleviating factors: Worse with palpation of the calf and popliteal area on the left lower extremity  Associated symptoms: None      Nurses Notes reviewed and agree, including vitals, allergies, social history and prior medical history.          Review of Systems   Constitutional: Negative.    HENT: Negative.    Eyes: Negative.    Respiratory: Negative.    Cardiovascular: Negative.    Gastrointestinal: Negative.    Genitourinary: Negative.    Musculoskeletal:        Left calf and popliteal pain concern for DVT   Skin: Negative.    Neurological: Negative.    Psychiatric/Behavioral: Negative.        Past Medical History:   Diagnosis Date   • Anxiety    • Blood clotting disorder (HCC)     superior and inferior vena cava current blood clots   • Depression    • Disease of thyroid gland    • History of angina     x15 years, sees cardiologist Q6 months at the [facility]. R/T PAT and palpitations   • Kidney stone    • Migraines    • MTHFR deficiency complicating pregnancy (HCC)    • PAT (paroxysmal atrial tachycardia) ([facility])    • Piercing     ears   • PTSD (post-traumatic stress disorder)    • Tattoo    • Wears glasses        Allergies   Allergen Reactions   • Elavil [Amitriptyline] Anxiety   • Percocet [Oxycodone-Acetaminophen] Hives     Can take tylenol   • Toradol [Ketorolac Tromethamine] Rash   • Tramadol Rash   • Contrast Dye Nausea And Vomiting     Pt states it also causes her to faint       Past Surgical History:    Procedure Laterality Date   • CARDIAC CATHETERIZATION     •  SECTION      x2   • DIAGNOSTIC LAPAROSCOPY     • EAR TUBES Bilateral    • KIDNEY SURGERY      stent in left kidney   • KNEE SURGERY Right    • SHOULDER SURGERY Right    • TONSILLECTOMY     • URETEROSCOPY LASER LITHOTRIPSY WITH STENT INSERTION Left 2022    Procedure: URETEROSCOPY retrograde LASER LITHOTRIPSY WITH STENT exchange,;  Surgeon: Del Billy MD;  Location: [facility];  Service: Urology;  Laterality: Left;   • WISDOM TOOTH EXTRACTION         History reviewed. No pertinent family history.    Social History     Socioeconomic History   • Marital status:    Tobacco Use   • Smoking status: Current Every Day Smoker     Packs/day: 0.50     Types: Cigarettes   • Smokeless tobacco: Never Used   Vaping Use   • Vaping Use: Never used   Substance and Sexual Activity   • Alcohol use: Not Currently     Comment: rare   • Drug use: Never   • Sexual activity: Never           Objective   Physical Exam  Vitals and nursing note reviewed.   Constitutional:       General: She is not in acute distress.     Appearance: Normal appearance. She is not ill-appearing, toxic-appearing or diaphoretic.   HENT:      Head: Normocephalic and atraumatic.      Nose: Nose normal.   Eyes:      Extraocular Movements: Extraocular movements intact.   Cardiovascular:      Rate and Rhythm: Normal rate and regular rhythm.      Pulses: Normal pulses.   Pulmonary:      Effort: Pulmonary effort is normal.   Abdominal:      General: Abdomen is flat.   Musculoskeletal:         General: Normal range of motion.      Cervical back: Normal range of motion.      Comments: Tenderness to the left popliteal area and calf.  Mild swelling of the left lower extremity below the knee as compared to the right.  2+ DP pulse on the left.  No signs of cellulitis.   Neurological:      General: No focal deficit present.      Mental Status: She is alert.   Psychiatric:         Mood and  Affect: Mood normal.         Behavior: Behavior normal.         Procedures           ED Course                                                 MDM  Extensive left lower extremity DVT despite Lovenox treatment.  Spoke with Dr. Mcadams, recommends transfer to [place].  Patient only willing to go to [facility].  Spoke with Dr. Pineda, hospitalist, accepts pending a bed, request to speak with hematology to ensure they are okay with heparin.  After spoke with Dr. Pineda patient called me into the room states she wants to leave as she is unhappy with her nurse.  I tried to reassure her we will take good care of her but she needs to be in the hospital and be on heparin and be transferred she wishes to leave AGAINST MEDICAL ADVICE.  I did explain to her that this clot can move and go to her lungs and cause pulm embolism which could cause death or worse and she expressed understanding and she states she does not care she does not want to be here any longer and wants to leave AGAINST MEDICAL ADVICE.  She expresses decision-making capability at this time.  Final diagnoses:   Acute deep vein thrombosis (DVT) of proximal vein of left lower extremity (HCC)       ED Disposition  ED Disposition     ED Disposition   AMA    Condition   --    Comment   --             Venkata Mcadams MD  [address] [id number] [phone]    Schedule an appointment as soon as possible for a visit       [facility] Emergency Department  [address] [address] [phone]    If symptoms worsen         Medication List      No changes were made to your prescriptions during this visit.          Elmer German PA-C  05/02/22 0593

## 2022-05-05 NOTE — PAYOR COMM NOTE
"Kate Mays (37 y.o. Female)     Yoselin RN [phone]. Fax [phone]            Date of Birth   1985    Social Security Number       Address   [place] [address]    Home Phone   [phone]    MRN   [id number]       Scientologist   None    Marital Status                               Admission Date   5/3/22    Admission Type   Emergency    Admitting Provider   Oscar English III, DO    Attending Provider       Department, Room/Bed   [facility] 2F, S214/1       Discharge Date   5/3/2022    Discharge Disposition   Left Against Medical Advice    Discharge Destination                               Attending Provider: (none)   Allergies: Elavil [Amitriptyline], Percocet [Oxycodone-acetaminophen], Toradol [Ketorolac Tromethamine], Tramadol, Contrast Dye    Isolation: None   Infection: None   Code Status: Prior   Advance Care Planning Activity    Ht: 167.6 cm (66\")   Wt: 83.5 kg (184 lb)    Admission Cmt: None   Principal Problem: Acute deep vein thrombosis (DVT) of left lower extremity, unspecified vein (HCC) [I82.402]                 Active Insurance as of 5/3/2022     Primary Coverage     Payor Plan Insurance Group Employer/Plan Group    [employer]HE[employer] MEDICAID [employer]ICAID      Payor Plan Address Payor Plan Phone Number Payor Plan Fax Number Effective Dates    [address] [address] [phone]  2022 - None Entered    [facility] [id number]       Subscriber Name Subscriber Birth Date Member ID       KATE MAYS 1985 [id number]                 Emergency Contacts      (Rel.) Home Phone Work Phone Mobile Phone    ETHEL FLORES (Spouse) [phone] -- [phone]    CRISTOPHERYASSINE DE SOUZA (Mother) -- -- [phone]               History & Physical      Oscar English III, DO at [address] [facility]              [facility] Medicine Services  HISTORY AND PHYSICAL    Patient Name: Kate Mays  : " 1985  MRN: [id number]  Primary Care Physician: Kacy Moise MD  Date of admission: 5/3/2022    Subjective   Subjective     Chief Complaint:  Leg swelling/ pain    HPI:  Francesca Mays is a 37 y.o. female with pmh significant for PAT, MTHFT gene mutation with multiple blood clots on lovenox therapy, PTSD, anxiety, migraines, chronic pain that presented to [facility] on  with history of acute swelling to LLE starting . Patient reports swelling increased and causing pain to touch and some while ambulating. Noticed sore starting on left foot due to swelling and pressure on shoe. Diagnosed with extensive LLE DVT on  and was to be admitted with transfer to [facility], but signed out AMA due to disagreement with RN in ED yesterday. Today presents with same issues and to be admitted. Denies soa/ MYERS/ dizziness.        Review of Systems   Constitutional: Positive for activity change.   HENT: Negative.    Eyes: Negative.    Respiratory: Negative.    Cardiovascular: Positive for leg swelling.   Gastrointestinal: Negative.    Endocrine: Negative.    Genitourinary: Negative.    Musculoskeletal: Positive for gait problem and myalgias.   Skin: Negative.    Hematological: Negative.    Psychiatric/Behavioral: Negative.         All other systems reviewed and are negative.     Personal History     Past Medical History:   Diagnosis Date   • Anxiety    • Blood clotting disorder (HCC)     superior and inferior vena cava current blood clots   • Depression    • Disease of thyroid gland    • History of angina     x15 years, sees cardiologist Q6 months at the [facility]. R/T PAT and palpitations   • Kidney stone    • Migraines    • MTHFR deficiency complicating pregnancy (HCC)    • PAT (paroxysmal atrial tachycardia) (HCC)    • Piercing     ears   • PTSD (post-traumatic stress disorder)    • Tattoo    • Wears glasses              Past Surgical History:   Procedure Laterality Date   • CARDIAC CATHETERIZATION     •   SECTION      x2   • DIAGNOSTIC LAPAROSCOPY     • EAR TUBES Bilateral    • KIDNEY SURGERY      stent in left kidney   • KNEE SURGERY Right    • SHOULDER SURGERY Right    • TONSILLECTOMY     • URETEROSCOPY LASER LITHOTRIPSY WITH STENT INSERTION Left 4/11/2022    Procedure: URETEROSCOPY retrograde LASER LITHOTRIPSY WITH STENT exchange,;  Surgeon: Del Billy MD;  Location: [facility];  Service: Urology;  Laterality: Left;   • WISDOM TOOTH EXTRACTION          Family History:  family history includes Clotting disorder in her father; Heart disease in her mother. Otherwise pertinent FHx was reviewed and unremarkable.     Social History:  reports that she has been smoking cigarettes. She has been smoking about 0.50 packs per day. She has never used smokeless tobacco. She reports previous alcohol use. She reports that she does not use drugs.  Social History     Social History Narrative   • Not on file       Medications:  docusate sodium, enoxaparin, furosemide, gabapentin, ibuprofen, methocarbamol, ondansetron, oxyCODONE, oxybutynin XL, phenazopyridine, propranolol, and tamsulosin    Allergies   Allergen Reactions   • Elavil [Amitriptyline] Anxiety   • Percocet [Oxycodone-Acetaminophen] Hives     Can take tylenol   • Toradol [Ketorolac Tromethamine] Rash   • Tramadol Rash   • Contrast Dye Nausea And Vomiting     Pt states it also causes her to faint       Objective   Objective     Vital Signs:   Temp:  [98.6 °F (37 °C)] 98.6 °F (37 °C)  Heart Rate:  [98] 98  Resp:  [18] 18  BP: (115-135)/(76-81) 115/76    Physical Exam   Constitutional: Awake, alert, sitting up in bed with children at bedside  Eyes: PERRLA, sclerae anicteric, no conjunctival injection  HENT: NCAT, mucous membranes moist  Neck: Supple, no thyromegaly, no lymphadenopathy, trachea midline  Respiratory: Clear to auscultation bilaterally, nonlabored respirations   Cardiovascular: RRR, no murmurs, rubs, or gallops, palpable pedal pulses  bilaterally  Gastrointestinal: Positive bowel sounds, soft, nontender, nondistended  Musculoskeletal: LLE edema 2+, no cording, TTP along calf and shin  Psychiatric: Appropriate affect, cooperative  Neurologic: Oriented x 3, strength symmetric in all extremities, Cranial Nerves grossly intact to confrontation, speech clear  Skin: No rashes      Results Reviewed:  I have personally reviewed most recent indicated data and agree with findings including:  [x]  Laboratory  [x]  Radiology  []  EKG/Telemetry  []  Pathology  []  Cardiac/Vascular Studies  [x]  Old records  []  Other:  Most pertinent findings include:      LAB RESULTS:      Lab 05/03/22  1411   WBC 7.26   HEMOGLOBIN 12.5   HEMATOCRIT 37.1   PLATELETS 319   NEUTROS ABS 4.31   IMMATURE GRANS (ABS) 0.02   LYMPHS ABS 2.18   MONOS ABS 0.45   EOS ABS 0.24   MCV 87.9   PROTIME 13.5   APTT 27.5*   HEPARIN ANTI-XA 0.10*         Lab 05/03/22  1411   SODIUM 140   POTASSIUM 3.9   CHLORIDE 108*   CO2 21.0*   ANION GAP 11.0   BUN 10   CREATININE 0.65   EGFR 117.9   GLUCOSE 97   CALCIUM 9.4         Lab 05/03/22  1411   TOTAL PROTEIN 6.7   ALBUMIN 4.10   GLOBULIN 2.6   ALT (SGPT) 17   AST (SGOT) 20   BILIRUBIN 0.2   ALK PHOS 87         Lab 05/03/22  1411   PROTIME 13.5   INR 1.04                 Brief Urine Lab Results  (Last result in the past 365 days)      Color   Clarity   Blood   Leuk Est   Nitrite   Protein   CREAT   Urine HCG        04/08/22 1447 Dark Yellow   Cloudy   Moderate (2+)   Moderate (2+)   Positive   30 mg/dL (1+)           04/08/22 1447               Positive           Microbiology Results (last 10 days)     ** No results found for the last 240 hours. **          US Venous Doppler Lower Extremity Left (duplex)    Result Date: 5/2/2022  FINAL REPORT TECHNIQUE: Multiple transverse and longitudinal images were performed of the deep venous system with augmentation and compression maneuvers. CLINICAL HISTORY: pain and swelling. hx of DVT. FINDINGS: There is  extensive echogenic thrombus and noncompressibility present throughout the left lower extremity deep veins.     Impression: Positive extensive DVT. Authenticated by Flash Ross MD on 05/02/2022 10:42:41 PM      Results for orders placed during the hospital encounter of 08/30/20    Adult Transthoracic Echo Complete W/ Cont if Necessary Per Protocol    Interpretation Summary  · Estimated EF = 65%.  · Left ventricular systolic function is normal.  · Calculated right ventricular systolic pressure from tricuspid regurgitation is 19 mmHg.      Assessment/Plan   Assessment & Plan       Acute deep vein thrombosis (DVT) of left lower extremity, unspecified vein (HCC)    MTHFR gene mutation    History of DVT (deep vein thrombosis)    Anxiety disorder    PAT (paroxysmal atrial tachycardia) (HCC)    Tobacco abuse      Acute LLE DVT  -- duplex reviewed from 5/2: extensive DVT involving all deep veins LLE  -- history of [facility]FR [facility] and [facility]. Failed coumadin and was previously deemed no NOAC candidate. Has been on Lovenox x 10years.   -- Will place orders for hypercoag work up  -- Hematology and vascular surgery consulted for am  -- heparin bolus given in ED, continue on gtt  -- continue home pain medication as needed    Pressure wound left foot  -- from edema/ pressure of shoe  -- WOC    PAT  -- propanolol    Chronic pain  -- continue gabapentin, oxycodone prn    Recent history of retention/ kidney stones  -- flomax, oxybutynin    Tobacco abuse  -- cessation advised  -- declines patch    DVT prophylaxis:  Heparin Gtt    CODE STATUS:   Code Status (Patient has no pulse and is not breathing): CPR (Attempt to Resuscitate)  Medical Interventions (Patient has pulse or is breathing): Full Support      This note has been completed as part of a split-shared workflow.       Electronically signed by TAN Mejia, 05/03/22, 4:49 PM EDT.      Attending   Admission Attestation        I have seen and examined the patient, performing an independent face-to-face diagnostic evaluation with plan of care reviewed and developed with the advanced practice clinician (APC).      Brief Summary Statement:   Francesca Mays is a 37 y.o. female who states she has history of LLE VTE and has tried to take several oral anticoagulants in the past, but they have been discontinued by her physicians for various reasons. She currently has daily SC Lovenox managed by her PCP. On Friday, she noticed increased pain and swelling in the left calf with some warmth on the posterior aspect. She had an ultrasound done in [place] where she was seen/evaluated and this confirmed recurrence of VTE. She was set to be admitted there but preferred to leave. She came to the ED today (Tuesday) due to continued symptoms. She denies fever/chills, nausea/emesis, chest pain, focal neurologic deficit, bowel habit change, LE trauma, long travel via plane or auto, or syncope.     Remainder of detailed HPI is as noted by APC and has been reviewed and/or edited by me for completeness.    Attending Physical Exam:  Constitutional: Awake, alert  Eyes: PERRLA, sclerae anicteric, no conjunctival injection  HENT: NCAT, mucous membranes moist  Neck: Supple, no thyromegaly, no lymphadenopathy, trachea midline  Respiratory: Clear to auscultation bilaterally, nonlabored respirations   Cardiovascular: RRR, no murmurs, rubs, or gallops, palpable pedal pulses bilaterally  Gastrointestinal: Positive bowel sounds, soft, nontender, nondistended  Musculoskeletal: No bilateral ankle edema, no clubbing or cyanosis to extremities; LLE has mildly swollen calf, pain on palpation, no cord/mass, mild increased warmth on palpation.  Psychiatric: Appropriate affect, cooperative  Neurologic: Oriented x 3, strength symmetric in all extremities, Cranial Nerves grossly intact to confrontation, speech clear  Skin: No rashes    Brief Assessment/Plan :  See detailed  assessment and plan developed with APC which I have reviewed and/or edited for completeness.        Admission Status: I believe that this patient meets inpatient criteria due to need for IV heparin and consults with hematology and Vasc. Surgery services.      Oscar English III,   05/03/22                Electronically signed by Oscar English III, DO at 05/03/22 7462       Lab Results (last 72 hours)     Procedure Component Value Units Date/Time    Heparin Anti-Xa [[id number]]  (Abnormal) Collected: 05/03/22 1411    Specimen: Blood Updated: 05/03/22 1455     Heparin Anti-Xa (UFH) 0.10 IU/ml     Protime-INR [[id number]]  (Normal) Collected: 05/03/22 1411    Specimen: Blood Updated: 05/03/22 1454     Protime 13.5 Seconds      INR 1.04    aPTT [[id number]]  (Abnormal) Collected: 05/03/22 1411    Specimen: Blood Updated: 05/03/22 1454     PTT 27.5 seconds     Narrative:      PTT = The equivalent PTT values for the therapeutic range of heparin levels at 0.3 to 0.5 U/ml are 60 to 70 seconds.    Comprehensive Metabolic Panel [[id number]]  (Abnormal) Collected: 05/03/22 1411    Specimen: Blood Updated: 05/03/22 1443     Glucose 97 mg/dL      BUN 10 mg/dL      Creatinine 0.65 mg/dL      Sodium 140 mmol/L      Potassium 3.9 mmol/L      Comment: Slight hemolysis detected by analyzer. Results may be affected.        Chloride 108 mmol/L      CO2 21.0 mmol/L      Calcium 9.4 mg/dL      Total Protein 6.7 g/dL      Albumin 4.10 g/dL      ALT (SGPT) 17 U/L      AST (SGOT) 20 U/L      Alkaline Phosphatase 87 U/L      Total Bilirubin 0.2 mg/dL      Globulin 2.6 gm/dL      Comment: Calculated Result        A/G Ratio 1.6 g/dL      BUN/Creatinine Ratio 15.4     Anion Gap 11.0 mmol/L      eGFR 117.9 mL/min/1.73      Comment: National Kidney Foundation and American Society of Nephrology (ASN) Task Force recommended calculation based on the Chronic Kidney Disease Epidemiology Collaboration (CKD-EPI) equation refit without  adjustment for race.       Narrative:      GFR Normal >60  Chronic Kidney Disease <60  Kidney Failure <15      CBC & Differential [[id number]]  (Normal) Collected: 05/03/22 1411    Specimen: Blood Updated: 05/03/22 1431    Narrative:      The following orders were created for panel order CBC & Differential.  Procedure                               Abnormality         Status                     ---------                               -----------         ------                     CBC Auto Differential[[id number]]        Normal              Final result                 Please view results for these tests on the individual orders.    CBC Auto Differential [[id number]]  (Normal) Collected: 05/03/22 1411    Specimen: Blood Updated: 05/03/22 1431     WBC 7.26 10*3/mm3      RBC 4.22 10*6/mm3      Hemoglobin 12.5 g/dL      Hematocrit 37.1 %      MCV 87.9 fL      MCH 29.6 pg      MCHC 33.7 g/dL      RDW 13.3 %      RDW-SD 42.5 fl      MPV 9.6 fL      Platelets 319 10*3/mm3      Neutrophil % 59.4 %      Lymphocyte % 30.0 %      Monocyte % 6.2 %      Eosinophil % 3.3 %      Basophil % 0.8 %      Immature Grans % 0.3 %      Neutrophils, Absolute 4.31 10*3/mm3      Lymphocytes, Absolute 2.18 10*3/mm3      Monocytes, Absolute 0.45 10*3/mm3      Eosinophils, Absolute 0.24 10*3/mm3      Basophils, Absolute 0.06 10*3/mm3      Immature Grans, Absolute 0.02 10*3/mm3      nRBC 0.0 /100 WBC           Imaging Results (Last 72 Hours)     ** No results found for the last 72 hours. **        Physician Progress Notes (last 72 hours)  Notes from 05/02/22 1622 through 05/05/22 1622   No notes of this type exist for this encounter.         Consult Notes (last 72 hours)  Notes from 05/02/22 1622 through 05/05/22 1622   No notes of this type exist for this encounter.

## 2022-05-07 NOTE — DISCHARGE SUMMARY
Francesca Mays is a 37 y.o. female with pmh significant for PAT, MTHFT gene mutation with multiple blood clots on lovenox therapy, PTSD, anxiety, migraines, chronic pain that presented to [facility] on 5/2 with history of acute swelling to LLE starting Friday 4/29. Patient reports swelling increased and causing pain to touch and some while ambulating. Noticed sore starting on left foot due to swelling and pressure on shoe. Diagnosed with extensive LLE DVT on 5/2 and was to be admitted with transfer to [facility], but signed out AMA due to disagreement with RN in ED yesterday. Today presents with same issues and to be admitted. Denies soa/ MYERS/ dizziness. She elected to leave AMA during the evening soon after arrival on the medical floor.

## 2022-05-10 ENCOUNTER — TELEPHONE (OUTPATIENT)
Dept: UROLOGY | Facility: CLINIC | Age: 37
End: 2022-05-10

## 2022-05-10 NOTE — TELEPHONE ENCOUNTER
Caller: KATECASSANDRA NICHOLAS    Relationship: SELF     Best call back number: [phone]    What form or medical record are you requesting: [employer]AP[employer]          Additional notes: PT DROPPED OFF [employer] PAPERWORK ON APR 21ST AND IT WAS SUPPOSED TO BE SIGNED AND SUBMITTED BY APR 22.  PT CALLED TO ENSURE STATUS.

## 2022-05-10 NOTE — TELEPHONE ENCOUNTER
[employer] was faxed on 04/18 and scanned in chart, called patient and let her know it was complete.

## 2022-07-01 ENCOUNTER — APPOINTMENT (OUTPATIENT)
Dept: ULTRASOUND IMAGING | Facility: HOSPITAL | Age: 37
End: 2022-07-01

## 2022-08-29 ENCOUNTER — TRANSCRIBE ORDERS (OUTPATIENT)
Dept: ADMINISTRATIVE | Facility: HOSPITAL | Age: 37
End: 2022-08-29

## 2022-08-29 DIAGNOSIS — N93.9 ABNORMAL UTERINE AND VAGINAL BLEEDING, UNSPECIFIED: Primary | ICD-10-CM

## 2022-09-12 ENCOUNTER — HOSPITAL ENCOUNTER (OUTPATIENT)
Dept: ULTRASOUND IMAGING | Facility: HOSPITAL | Age: 37
Discharge: HOME | End: 2022-09-12
Admitting: OBSTETRICS & GYNECOLOGY

## 2022-09-12 DIAGNOSIS — N93.9 ABNORMAL UTERINE AND VAGINAL BLEEDING, UNSPECIFIED: ICD-10-CM

## 2022-09-12 PROCEDURE — 76830 TRANSVAGINAL US NON-OB: CPT

## 2022-09-29 ENCOUNTER — APPOINTMENT (OUTPATIENT)
Dept: ULTRASOUND IMAGING | Facility: HOSPITAL | Age: 37
End: 2022-09-29

## 2024-06-10 ENCOUNTER — APPOINTMENT (OUTPATIENT)
Dept: CT IMAGING | Facility: HOSPITAL | Age: 39
End: 2024-06-10
Payer: OTHER GOVERNMENT

## 2024-06-10 ENCOUNTER — HOSPITAL ENCOUNTER (EMERGENCY)
Facility: HOSPITAL | Age: 39
Discharge: HOME | End: 2024-06-10
Attending: STUDENT IN AN ORGANIZED HEALTH CARE EDUCATION/TRAINING PROGRAM
Payer: OTHER GOVERNMENT

## 2024-06-10 VITALS
HEART RATE: 72 BPM | HEIGHT: 66 IN | WEIGHT: 190 LBS | TEMPERATURE: 97.7 F | DIASTOLIC BLOOD PRESSURE: 69 MMHG | BODY MASS INDEX: 30.53 KG/M2 | OXYGEN SATURATION: 95 % | SYSTOLIC BLOOD PRESSURE: 125 MMHG | RESPIRATION RATE: 16 BRPM

## 2024-06-10 DIAGNOSIS — M54.50 ACUTE BILATERAL LOW BACK PAIN WITHOUT SCIATICA: ICD-10-CM

## 2024-06-10 DIAGNOSIS — Z79.01 ANTICOAGULATED ON COUMADIN: ICD-10-CM

## 2024-06-10 DIAGNOSIS — R51.9 NONINTRACTABLE HEADACHE, UNSPECIFIED CHRONICITY PATTERN, UNSPECIFIED HEADACHE TYPE: ICD-10-CM

## 2024-06-10 DIAGNOSIS — S16.1XXA STRAIN OF NECK MUSCLE, INITIAL ENCOUNTER: ICD-10-CM

## 2024-06-10 DIAGNOSIS — R59.1 LYMPHADENOPATHY: ICD-10-CM

## 2024-06-10 DIAGNOSIS — R91.1 LUNG NODULE SEEN ON IMAGING STUDY: ICD-10-CM
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
## 2024-06-10 LAB
ANION GAP SERPL CALCULATED.3IONS-SCNC: 9.6 MMOL/L (ref 5–15)
BASOPHILS # BLD AUTO: 0.06 10*3/MM3 (ref 0–0.2)
BASOPHILS NFR BLD AUTO: 0.9 % (ref 0–1.5)
BUN SERPL-MCNC: 10 MG/DL (ref 6–20)
BUN/CREAT SERPL: 10 (ref 7–25)
CALCIUM SPEC-SCNC: 9.5 MG/DL (ref 8.6–10.5)
CHLORIDE SERPL-SCNC: 106 MMOL/L (ref 98–107)
CO2 SERPL-SCNC: 23.4 MMOL/L (ref 22–29)
CREAT SERPL-MCNC: 1 MG/DL (ref 0.57–1)
DEPRECATED RDW RBC AUTO: 45.6 FL (ref 37–54)
EGFRCR SERPLBLD CKD-EPI 2021: 74.6 ML/MIN/1.73
EOSINOPHIL # BLD AUTO: 0.34 10*3/MM3 (ref 0–0.4)
EOSINOPHIL NFR BLD AUTO: 5.4 % (ref 0.3–6.2)
ERYTHROCYTE [DISTWIDTH] IN BLOOD BY AUTOMATED COUNT: 14.4 % (ref 12.3–15.4)
GLUCOSE SERPL-MCNC: 101 MG/DL (ref 65–99)
HCG SERPL QL: NEGATIVE
HCT VFR BLD AUTO: 42 % (ref 34–46.6)
HGB BLD-MCNC: 14 G/DL (ref 12–15.9)
IMM GRANULOCYTES # BLD AUTO: 0.01 10*3/MM3 (ref 0–0.05)
IMM GRANULOCYTES NFR BLD AUTO: 0.2 % (ref 0–0.5)
INR PPP: 3.76 (ref 0.9–1.1)
LYMPHOCYTES # BLD AUTO: 1.26 10*3/MM3 (ref 0.7–3.1)
LYMPHOCYTES NFR BLD AUTO: 19.9 % (ref 19.6–45.3)
MCH RBC QN AUTO: 28.6 PG (ref 26.6–33)
MCHC RBC AUTO-ENTMCNC: 33.3 G/DL (ref 31.5–35.7)
MCV RBC AUTO: 85.9 FL (ref 79–97)
MONOCYTES # BLD AUTO: 0.4 10*3/MM3 (ref 0.1–0.9)
MONOCYTES NFR BLD AUTO: 6.3 % (ref 5–12)
NEUTROPHILS NFR BLD AUTO: 4.26 10*3/MM3 (ref 1.7–7)
NEUTROPHILS NFR BLD AUTO: 67.3 % (ref 42.7–76)
NRBC BLD AUTO-RTO: 0 /100 WBC (ref 0–0.2)
PLATELET # BLD AUTO: 319 10*3/MM3 (ref 140–450)
PMV BLD AUTO: 10 FL (ref 6–12)
POTASSIUM SERPL-SCNC: 4.2 MMOL/L (ref 3.5–5.2)
PROTHROMBIN TIME: 37.9 SECONDS (ref 12.3–15.1)
RBC # BLD AUTO: 4.89 10*6/MM3 (ref 3.77–5.28)
SODIUM SERPL-SCNC: 139 MMOL/L (ref 136–145)
WBC NRBC COR # BLD AUTO: 6.33 10*3/MM3 (ref 3.4–10.8)

## 2024-06-10 PROCEDURE — 72125 CT NECK SPINE W/O DYE: CPT

## 2024-06-10 PROCEDURE — 70450 CT HEAD/BRAIN W/O DYE: CPT

## 2024-06-10 PROCEDURE — 85025 COMPLETE CBC W/AUTO DIFF WBC: CPT | Performed by: STUDENT IN AN ORGANIZED HEALTH CARE EDUCATION/TRAINING PROGRAM

## 2024-06-10 PROCEDURE — 74176 CT ABD & PELVIS W/O CONTRAST: CPT

## 2024-06-10 PROCEDURE — 85610 PROTHROMBIN TIME: CPT | Performed by: STUDENT IN AN ORGANIZED HEALTH CARE EDUCATION/TRAINING PROGRAM

## 2024-06-10 PROCEDURE — 80048 BASIC METABOLIC PNL TOTAL CA: CPT | Performed by: STUDENT IN AN ORGANIZED HEALTH CARE EDUCATION/TRAINING PROGRAM

## 2024-06-10 PROCEDURE — 99284 EMERGENCY DEPT VISIT MOD MDM: CPT

## 2024-06-10 PROCEDURE — 71250 CT THORAX DX C-: CPT

## 2024-06-10 PROCEDURE — 36415 COLL VENOUS BLD VENIPUNCTURE: CPT

## 2024-06-10 PROCEDURE — 84703 CHORIONIC GONADOTROPIN ASSAY: CPT | Performed by: STUDENT IN AN ORGANIZED HEALTH CARE EDUCATION/TRAINING PROGRAM

## 2024-06-10 RX ORDER — METHOCARBAMOL 500 MG/1
1000 TABLET, FILM COATED ORAL ONCE
Status: DISCONTINUED | OUTPATIENT
Start: 2024-06-10 | End: 2024-06-10

## 2024-06-10 RX ORDER — ACETAMINOPHEN 500 MG
1000 TABLET ORAL ONCE
Status: COMPLETED | OUTPATIENT
Start: 2024-06-10 | End: 2024-06-10

## 2024-06-10 RX ORDER — ACETAMINOPHEN 325 MG/1
325 TABLET ORAL EVERY 6 HOURS PRN
Status: DISCONTINUED | OUTPATIENT
Start: 2024-06-10 | End: 2024-06-10 | Stop reason: HOSPADM

## 2024-06-10 RX ORDER — CYCLOBENZAPRINE HCL 10 MG
10 TABLET ORAL ONCE
Status: COMPLETED | OUTPATIENT
Start: 2024-06-10 | End: 2024-06-10

## 2024-06-10 RX ORDER — CYCLOBENZAPRINE HCL 5 MG
5 TABLET ORAL 3 TIMES DAILY PRN
Qty: 12 TABLET | Refills: 0 | Status: SHIPPED | OUTPATIENT
Start: 2024-06-10

## 2024-06-10 RX ADMIN — CYCLOBENZAPRINE 10 MG: 10 TABLET, FILM COATED ORAL at 12:11

## 2024-06-10 RX ADMIN — ACETAMINOPHEN 1000 MG: 500 TABLET, FILM COATED ORAL at 11:59

## 2024-06-10 RX ADMIN — ACETAMINOPHEN 325 MG: 325 TABLET, FILM COATED ORAL at 12:11

## 2024-06-10 NOTE — DISCHARGE INSTRUCTIONS
Take over-the-counter analgesics as needed for pain control.  For breakthrough pain you may take muscle relaxer as prescribed.  No driving or operating heavy machinery while taking this medication.  Do not combine with alcohol.  Please follow-up with your hematologist for further evaluation and management of lung nodule and lymph node enlargement found incidentally on imaging.
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
## 2024-06-28 ENCOUNTER — TRANSCRIBE ORDERS (OUTPATIENT)
Dept: ADMINISTRATIVE | Facility: HOSPITAL | Age: 39
End: 2024-06-28
Payer: OTHER GOVERNMENT

## 2024-06-28 DIAGNOSIS — N93.9 ABNORMAL UTERINE AND VAGINAL BLEEDING, UNSPECIFIED: Primary | ICD-10-CM

## 2024-08-13 ENCOUNTER — HOSPITAL ENCOUNTER (OUTPATIENT)
Dept: MRI IMAGING | Facility: HOSPITAL | Age: 39
Discharge: HOME | End: 2024-08-13
Admitting: OBSTETRICS & GYNECOLOGY
Payer: OTHER GOVERNMENT

## 2024-08-13 DIAGNOSIS — N93.9 ABNORMAL UTERINE AND VAGINAL BLEEDING, UNSPECIFIED: ICD-10-CM

## 2024-08-13 PROCEDURE — A9577 INJ MULTIHANCE: HCPCS | Performed by: OBSTETRICS & GYNECOLOGY

## 2024-08-13 PROCEDURE — 0 GADOBENATE DIMEGLUMINE 529 MG/ML SOLUTION: Performed by: OBSTETRICS & GYNECOLOGY

## 2024-08-13 PROCEDURE — 72197 MRI PELVIS W/O & W/DYE: CPT

## 2024-08-13 RX ADMIN — GADOBENATE DIMEGLUMINE 15 ML: 529 INJECTION, SOLUTION INTRAVENOUS at 13:09

## 2025-04-21 ENCOUNTER — APPOINTMENT (OUTPATIENT)
Dept: CT IMAGING | Facility: HOSPITAL | Age: 40
End: 2025-04-21
Payer: OTHER GOVERNMENT

## 2025-04-21 ENCOUNTER — APPOINTMENT (OUTPATIENT)
Dept: ULTRASOUND IMAGING | Facility: HOSPITAL | Age: 40
End: 2025-04-21
Payer: OTHER GOVERNMENT

## 2025-04-21 ENCOUNTER — HOSPITAL ENCOUNTER (EMERGENCY)
Facility: HOSPITAL | Age: 40
Discharge: ANOTHER HOSPITAL | End: 2025-04-21
Attending: STUDENT IN AN ORGANIZED HEALTH CARE EDUCATION/TRAINING PROGRAM | Admitting: STUDENT IN AN ORGANIZED HEALTH CARE EDUCATION/TRAINING PROGRAM
Payer: OTHER GOVERNMENT

## 2025-04-21 VITALS
OXYGEN SATURATION: 100 % | RESPIRATION RATE: 18 BRPM | HEIGHT: 66 IN | DIASTOLIC BLOOD PRESSURE: 61 MMHG | BODY MASS INDEX: 30.53 KG/M2 | HEART RATE: 95 BPM | TEMPERATURE: 98 F | WEIGHT: 190 LBS | SYSTOLIC BLOOD PRESSURE: 121 MMHG

## 2025-04-21 DIAGNOSIS — I82.5Y2 CHRONIC DEEP VEIN THROMBOSIS (DVT) OF PROXIMAL VEIN OF LEFT LOWER EXTREMITY: Primary | ICD-10-CM

## 2025-04-21 DIAGNOSIS — M79.605 LEFT LEG PAIN: ICD-10-CM

## 2025-04-21 LAB
ALBUMIN SERPL-MCNC: 4 G/DL (ref 3.5–5.2)
ALBUMIN/GLOB SERPL: 1.4 G/DL
ALP SERPL-CCNC: 88 U/L (ref 39–117)
ALT SERPL W P-5'-P-CCNC: 21 U/L (ref 1–33)
ANION GAP SERPL CALCULATED.3IONS-SCNC: 10.7 MMOL/L (ref 5–15)
APTT PPP: 31.2 SECONDS (ref 70–100)
AST SERPL-CCNC: 14 U/L (ref 1–32)
BASOPHILS # BLD AUTO: 0.04 10*3/MM3 (ref 0–0.2)
BASOPHILS NFR BLD AUTO: 0.5 % (ref 0–1.5)
BILIRUB SERPL-MCNC: 0.5 MG/DL (ref 0–1.2)
BUN SERPL-MCNC: 6 MG/DL (ref 6–20)
BUN/CREAT SERPL: 8.3 (ref 7–25)
CALCIUM SPEC-SCNC: 9.2 MG/DL (ref 8.6–10.5)
CHLORIDE SERPL-SCNC: 105 MMOL/L (ref 98–107)
CO2 SERPL-SCNC: 26.3 MMOL/L (ref 22–29)
CREAT SERPL-MCNC: 0.72 MG/DL (ref 0.57–1)
CRP SERPL-MCNC: 10.38 MG/DL (ref 0–0.5)
D-LACTATE SERPL-SCNC: 0.7 MMOL/L (ref 0.5–2)
DEPRECATED RDW RBC AUTO: 43.9 FL (ref 37–54)
EGFRCR SERPLBLD CKD-EPI 2021: 110.6 ML/MIN/1.73
EOSINOPHIL # BLD AUTO: 0.18 10*3/MM3 (ref 0–0.4)
EOSINOPHIL NFR BLD AUTO: 2.5 % (ref 0.3–6.2)
ERYTHROCYTE [DISTWIDTH] IN BLOOD BY AUTOMATED COUNT: 13.6 % (ref 12.3–15.4)
GLOBULIN UR ELPH-MCNC: 2.9 GM/DL
GLUCOSE SERPL-MCNC: 75 MG/DL (ref 65–99)
HCT VFR BLD AUTO: 40.3 % (ref 34–46.6)
HGB BLD-MCNC: 13.5 G/DL (ref 12–15.9)
IMM GRANULOCYTES # BLD AUTO: 0.03 10*3/MM3 (ref 0–0.05)
IMM GRANULOCYTES NFR BLD AUTO: 0.4 % (ref 0–0.5)
INR PPP: 1.32 (ref 0.9–1.1)
LYMPHOCYTES # BLD AUTO: 1.02 10*3/MM3 (ref 0.7–3.1)
LYMPHOCYTES NFR BLD AUTO: 13.9 % (ref 19.6–45.3)
MCH RBC QN AUTO: 29.5 PG (ref 26.6–33)
MCHC RBC AUTO-ENTMCNC: 33.5 G/DL (ref 31.5–35.7)
MCV RBC AUTO: 88.2 FL (ref 79–97)
MONOCYTES # BLD AUTO: 0.71 10*3/MM3 (ref 0.1–0.9)
MONOCYTES NFR BLD AUTO: 9.7 % (ref 5–12)
NEUTROPHILS NFR BLD AUTO: 5.34 10*3/MM3 (ref 1.7–7)
NEUTROPHILS NFR BLD AUTO: 73 % (ref 42.7–76)
NRBC BLD AUTO-RTO: 0 /100 WBC (ref 0–0.2)
PLATELET # BLD AUTO: 217 10*3/MM3 (ref 140–450)
PMV BLD AUTO: 10.2 FL (ref 6–12)
POTASSIUM SERPL-SCNC: 3.4 MMOL/L (ref 3.5–5.2)
PROT SERPL-MCNC: 6.9 G/DL (ref 6–8.5)
PROTHROMBIN TIME: 17.3 SECONDS (ref 12.3–15.1)
RBC # BLD AUTO: 4.57 10*6/MM3 (ref 3.77–5.28)
SODIUM SERPL-SCNC: 142 MMOL/L (ref 136–145)
WBC NRBC COR # BLD AUTO: 7.32 10*3/MM3 (ref 3.4–10.8)

## 2025-04-21 PROCEDURE — 96374 THER/PROPH/DIAG INJ IV PUSH: CPT

## 2025-04-21 PROCEDURE — 96376 TX/PRO/DX INJ SAME DRUG ADON: CPT

## 2025-04-21 PROCEDURE — 85610 PROTHROMBIN TIME: CPT | Performed by: STUDENT IN AN ORGANIZED HEALTH CARE EDUCATION/TRAINING PROGRAM

## 2025-04-21 PROCEDURE — 25010000002 DIPHENHYDRAMINE PER 50 MG

## 2025-04-21 PROCEDURE — 25010000002 ONDANSETRON PER 1 MG: Performed by: STUDENT IN AN ORGANIZED HEALTH CARE EDUCATION/TRAINING PROGRAM

## 2025-04-21 PROCEDURE — 25010000002 HYDROMORPHONE 1 MG/ML SOLUTION: Performed by: STUDENT IN AN ORGANIZED HEALTH CARE EDUCATION/TRAINING PROGRAM

## 2025-04-21 PROCEDURE — 96375 TX/PRO/DX INJ NEW DRUG ADDON: CPT

## 2025-04-21 PROCEDURE — 85730 THROMBOPLASTIN TIME PARTIAL: CPT | Performed by: STUDENT IN AN ORGANIZED HEALTH CARE EDUCATION/TRAINING PROGRAM

## 2025-04-21 PROCEDURE — 96372 THER/PROPH/DIAG INJ SC/IM: CPT

## 2025-04-21 PROCEDURE — 75635 CT ANGIO ABDOMINAL ARTERIES: CPT

## 2025-04-21 PROCEDURE — 25010000002 METHYLPREDNISOLONE PER 40 MG: Performed by: STUDENT IN AN ORGANIZED HEALTH CARE EDUCATION/TRAINING PROGRAM

## 2025-04-21 PROCEDURE — 93971 EXTREMITY STUDY: CPT

## 2025-04-21 PROCEDURE — 86140 C-REACTIVE PROTEIN: CPT | Performed by: STUDENT IN AN ORGANIZED HEALTH CARE EDUCATION/TRAINING PROGRAM

## 2025-04-21 PROCEDURE — 99285 EMERGENCY DEPT VISIT HI MDM: CPT | Performed by: STUDENT IN AN ORGANIZED HEALTH CARE EDUCATION/TRAINING PROGRAM

## 2025-04-21 PROCEDURE — 25510000001 IOPAMIDOL 61 % SOLUTION: Performed by: STUDENT IN AN ORGANIZED HEALTH CARE EDUCATION/TRAINING PROGRAM

## 2025-04-21 PROCEDURE — 25010000002 DIPHENHYDRAMINE PER 50 MG: Performed by: STUDENT IN AN ORGANIZED HEALTH CARE EDUCATION/TRAINING PROGRAM

## 2025-04-21 PROCEDURE — 80053 COMPREHEN METABOLIC PANEL: CPT | Performed by: STUDENT IN AN ORGANIZED HEALTH CARE EDUCATION/TRAINING PROGRAM

## 2025-04-21 PROCEDURE — 85025 COMPLETE CBC W/AUTO DIFF WBC: CPT | Performed by: STUDENT IN AN ORGANIZED HEALTH CARE EDUCATION/TRAINING PROGRAM

## 2025-04-21 PROCEDURE — 83605 ASSAY OF LACTIC ACID: CPT | Performed by: STUDENT IN AN ORGANIZED HEALTH CARE EDUCATION/TRAINING PROGRAM

## 2025-04-21 PROCEDURE — 25010000002 ENOXAPARIN PER 10 MG

## 2025-04-21 PROCEDURE — 25010000002 MORPHINE PER 10 MG: Performed by: STUDENT IN AN ORGANIZED HEALTH CARE EDUCATION/TRAINING PROGRAM

## 2025-04-21 RX ORDER — IOPAMIDOL 612 MG/ML
100 INJECTION, SOLUTION INTRAVASCULAR
Status: COMPLETED | OUTPATIENT
Start: 2025-04-21 | End: 2025-04-21

## 2025-04-21 RX ORDER — ENOXAPARIN SODIUM 100 MG/ML
80 INJECTION SUBCUTANEOUS EVERY 12 HOURS
COMMUNITY

## 2025-04-21 RX ORDER — DIPHENHYDRAMINE HYDROCHLORIDE 50 MG/ML
25 INJECTION, SOLUTION INTRAMUSCULAR; INTRAVENOUS ONCE
Status: COMPLETED | OUTPATIENT
Start: 2025-04-21 | End: 2025-04-21

## 2025-04-21 RX ORDER — METHYLPREDNISOLONE SODIUM SUCCINATE 40 MG/ML
40 INJECTION, POWDER, LYOPHILIZED, FOR SOLUTION INTRAMUSCULAR; INTRAVENOUS EVERY 4 HOURS
Status: DISCONTINUED | OUTPATIENT
Start: 2025-04-21 | End: 2025-04-21 | Stop reason: HOSPADM

## 2025-04-21 RX ORDER — ONDANSETRON 2 MG/ML
4 INJECTION INTRAMUSCULAR; INTRAVENOUS ONCE
Status: COMPLETED | OUTPATIENT
Start: 2025-04-21 | End: 2025-04-21

## 2025-04-21 RX ORDER — HYDROCODONE BITARTRATE AND ACETAMINOPHEN 5; 325 MG/1; MG/1
1 TABLET ORAL ONCE
Refills: 0 | Status: COMPLETED | OUTPATIENT
Start: 2025-04-21 | End: 2025-04-21

## 2025-04-21 RX ORDER — SODIUM CHLORIDE 0.9 % (FLUSH) 0.9 %
10 SYRINGE (ML) INJECTION AS NEEDED
Status: DISCONTINUED | OUTPATIENT
Start: 2025-04-21 | End: 2025-04-21 | Stop reason: HOSPADM

## 2025-04-21 RX ORDER — ENOXAPARIN SODIUM 100 MG/ML
1 INJECTION SUBCUTANEOUS EVERY 12 HOURS
Status: DISCONTINUED | OUTPATIENT
Start: 2025-04-21 | End: 2025-04-21 | Stop reason: HOSPADM

## 2025-04-21 RX ORDER — DIPHENHYDRAMINE HYDROCHLORIDE 50 MG/ML
50 INJECTION, SOLUTION INTRAMUSCULAR; INTRAVENOUS
Status: COMPLETED | OUTPATIENT
Start: 2025-04-21 | End: 2025-04-21

## 2025-04-21 RX ADMIN — METHYLPREDNISOLONE SODIUM SUCCINATE 40 MG: 40 INJECTION, POWDER, FOR SOLUTION INTRAMUSCULAR; INTRAVENOUS at 15:30

## 2025-04-21 RX ADMIN — ONDANSETRON 4 MG: 2 INJECTION INTRAMUSCULAR; INTRAVENOUS at 12:18

## 2025-04-21 RX ADMIN — DIPHENHYDRAMINE HYDROCHLORIDE 25 MG: 50 INJECTION, SOLUTION INTRAMUSCULAR; INTRAVENOUS at 15:08

## 2025-04-21 RX ADMIN — ENOXAPARIN SODIUM 90 MG: 100 INJECTION SUBCUTANEOUS at 15:08

## 2025-04-21 RX ADMIN — DIPHENHYDRAMINE HYDROCHLORIDE 50 MG: 50 INJECTION, SOLUTION INTRAMUSCULAR; INTRAVENOUS at 12:17

## 2025-04-21 RX ADMIN — METHYLPREDNISOLONE SODIUM SUCCINATE 40 MG: 40 INJECTION, POWDER, FOR SOLUTION INTRAMUSCULAR; INTRAVENOUS at 12:20

## 2025-04-21 RX ADMIN — HYDROCODONE BITARTRATE AND ACETAMINOPHEN 1 TABLET: 5; 325 TABLET ORAL at 16:45

## 2025-04-21 RX ADMIN — HYDROMORPHONE HYDROCHLORIDE 1 MG: 1 INJECTION, SOLUTION INTRAMUSCULAR; INTRAVENOUS; SUBCUTANEOUS at 14:27

## 2025-04-21 RX ADMIN — IOPAMIDOL 100 ML: 612 INJECTION, SOLUTION INTRAVENOUS at 12:59

## 2025-04-21 RX ADMIN — MORPHINE SULFATE 4 MG: 4 INJECTION, SOLUTION INTRAMUSCULAR; INTRAVENOUS at 12:19

## 2025-04-21 NOTE — PHARMACY RECOMMENDATION
"Pharmacy Consult - Enoxaparin Dosing  Francesca Auguste is a 39 y.o. female who has been consulted to dose enoxaparin for DVT/PE (active thrombus).     Allergies  Elavil [amitriptyline], Percocet [oxycodone-acetaminophen], Toradol [ketorolac tromethamine], Tramadol, and Contrast dye (echo or unknown ct/mr)    Relevant clinical data and objective history reviewed:   [Ht: 167.6 cm (66\"); Wt: 86.2 kg (190 lb)]  Body mass index is 30.67 kg/m².  Estimated Creatinine Clearance: 118.4 mL/min (by C-G formula based on SCr of 0.72 mg/dL).  Results from last 7 days   Lab Units 04/21/25  1212   HEMOGLOBIN g/dL 13.5   HEMATOCRIT % 40.3   PLATELETS 10*3/mm3 217   INR  1.32*   CREATININE mg/dL 0.72       Asessment/Plan  Initiate enoxaparin 1 mg/kg (90 mg) subq q 12 hrs until INR therapeutic (target range: 2.0 to 2.5).  Pharmacy will monitor Ms. Auguste's renal function and clinical status and adjust the enoxaparin dose and/or frequency as needed.      Thank you for the opportunity to consult on this patient.    Nacho Dobson RP, Pharm.D.  04/21/25  14:35 EDT    "

## 2025-04-21 NOTE — ED PROVIDER NOTES
EMERGENCY DEPARTMENT ENCOUNTER    Pt Name: Francesca Auguste  MRN: [id number]  Pt :   1985  Room Number:  24SF/24  Date of encounter:  2025  PCP: Kacy Moise MD  ED Provider: Alo Daly PA-C    Historian: Patient, nursing notes      HPI:  Chief Complaint: Left leg pain        Context: Francesca Auguste is a 39 y.o. female who presents to the ED c/o left leg pain and swelling worsening over the past several days.  Patient states she believes that her iliac artery stent has collapsed due to her level of pain extending from her inguinal crease distally along the left leg.  Patient also reports history of chronic left leg DVT for which she takes warfarin and she takes aspirin and Plavix due to her iliac stent placement.      PAST MEDICAL HISTORY  Past Medical History:   Diagnosis Date    Anxiety     Blood clotting disorder     superior and inferior vena cava current blood clots    Depression     Disease of thyroid gland     History of angina     x15 years, sees cardiologist Q6 months at the [facility]. R/T PAT and palpitations    Kidney stone     Migraines     MTHFR deficiency complicating pregnancy     PAT (paroxysmal atrial tachycardia)     Piercing     ears    PTSD (post-traumatic stress disorder)     Tattoo     Wears glasses          PAST SURGICAL HISTORY  Past Surgical History:   Procedure Laterality Date    CARDIAC CATHETERIZATION       SECTION      x2    DIAGNOSTIC LAPAROSCOPY      EAR TUBES Bilateral     KIDNEY SURGERY      stent in left kidney    KNEE SURGERY Right     SHOULDER SURGERY Right     TONSILLECTOMY      URETEROSCOPY LASER LITHOTRIPSY WITH STENT INSERTION Left 2022    Procedure: URETEROSCOPY retrograde LASER LITHOTRIPSY WITH STENT exchange,;  Surgeon: Del Billy MD;  Location: [facility];  Service: Urology;  Laterality: Left;    WISDOM TOOTH EXTRACTION           FAMILY HISTORY  Family History   Problem Relation Age of Onset    Heart disease Mother      Clotting disorder Father          SOCIAL HISTORY  Social History     Socioeconomic History    Marital status:    Tobacco Use    Smoking status: Every Day     Current packs/day: 0.50     Types: Cigarettes    Smokeless tobacco: Never   Vaping Use    Vaping status: Never Used   Substance and Sexual Activity    Alcohol use: Not Currently     Comment: rare    Drug use: Never    Sexual activity: Never         ALLERGIES  Elavil [amitriptyline], Percocet [oxycodone-acetaminophen], Toradol [ketorolac tromethamine], Tramadol, and Contrast dye (echo or unknown ct/mr)        REVIEW OF SYSTEMS  Review of Systems   Constitutional:  Negative for chills and fever.   HENT:  Negative for congestion and sore throat.    Respiratory:  Negative for cough and shortness of breath.    Cardiovascular:  Negative for chest pain.   Gastrointestinal:  Negative for abdominal pain, nausea and vomiting.   Genitourinary:  Negative for dysuria.   Musculoskeletal:  Negative for back pain.        Left leg pain and swelling   Skin:  Negative for wound.   Neurological:  Negative for dizziness and headaches.   Psychiatric/Behavioral:  Negative for confusion.    All other systems reviewed and are negative.         All systems reviewed and negative except for those discussed in HPI.       PHYSICAL EXAM    I have reviewed the triage vital signs and nursing notes.    ED Triage Vitals [04/21/25 1115]   Temp Heart Rate Resp BP SpO2   98 °F (36.7 °C) 95 18 121/61 100 %      Temp src Heart Rate Source Patient Position BP Location FiO2 (%)   Oral Monitor Sitting Left arm --       Physical Exam  Vitals and nursing note reviewed.   Constitutional:       General: She is not in acute distress.     Appearance: She is not ill-appearing, toxic-appearing or diaphoretic.   HENT:      Head: Normocephalic and atraumatic.      Mouth/Throat:      Mouth: Mucous membranes are moist.      Pharynx: Oropharynx is clear.   Eyes:      Extraocular Movements: Extraocular  movements intact.   Cardiovascular:      Rate and Rhythm: Normal rate.      Heart sounds: Normal heart sounds.   Pulmonary:      Effort: Pulmonary effort is normal. No respiratory distress.      Breath sounds: Normal breath sounds.   Abdominal:      Tenderness: There is no abdominal tenderness.   Skin:     General: Skin is warm and dry.      Findings: No rash.   Neurological:      Mental Status: She is alert.             LAB RESULTS  Recent Results (from the past 24 hours)   Comprehensive Metabolic Panel    Collection Time: 04/21/25 12:12 PM    Specimen: Blood   Result Value Ref Range    Glucose 75 65 - 99 mg/dL    BUN 6 6 - 20 mg/dL    Creatinine 0.72 0.57 - 1.00 mg/dL    Sodium 142 136 - 145 mmol/L    Potassium 3.4 (L) 3.5 - 5.2 mmol/L    Chloride 105 98 - 107 mmol/L    CO2 26.3 22.0 - 29.0 mmol/L    Calcium 9.2 8.6 - 10.5 mg/dL    Total Protein 6.9 6.0 - 8.5 g/dL    Albumin 4.0 3.5 - 5.2 g/dL    ALT (SGPT) 21 1 - 33 U/L    AST (SGOT) 14 1 - 32 U/L    Alkaline Phosphatase 88 39 - 117 U/L    Total Bilirubin 0.5 0.0 - 1.2 mg/dL    Globulin 2.9 gm/dL    A/G Ratio 1.4 g/dL    BUN/Creatinine Ratio 8.3 7.0 - 25.0    Anion Gap 10.7 5.0 - 15.0 mmol/L    eGFR 110.6 >60.0 mL/min/1.73   Protime-INR    Collection Time: 04/21/25 12:12 PM    Specimen: Blood   Result Value Ref Range    Protime 17.3 (H) 12.3 - 15.1 Seconds    INR 1.32 (H) 0.90 - 1.10   aPTT    Collection Time: 04/21/25 12:12 PM    Specimen: Blood   Result Value Ref Range    PTT 31.2 (L) 70.0 - 100.0 seconds   Lactic Acid, Plasma    Collection Time: 04/21/25 12:12 PM    Specimen: Blood   Result Value Ref Range    Lactate 0.7 0.5 - 2.0 mmol/L   C-reactive Protein    Collection Time: 04/21/25 12:12 PM    Specimen: Blood   Result Value Ref Range    C-Reactive Protein 10.38 (H) 0.00 - 0.50 mg/dL   CBC Auto Differential    Collection Time: 04/21/25 12:12 PM    Specimen: Blood   Result Value Ref Range    WBC 7.32 3.40 - 10.80 10*3/mm3    RBC 4.57 3.77 - 5.28 10*6/mm3     Hemoglobin 13.5 12.0 - 15.9 g/dL    Hematocrit 40.3 34.0 - 46.6 %    MCV 88.2 79.0 - 97.0 fL    MCH 29.5 26.6 - 33.0 pg    MCHC 33.5 31.5 - 35.7 g/dL    RDW 13.6 12.3 - 15.4 %    RDW-SD 43.9 37.0 - 54.0 fl    MPV 10.2 6.0 - 12.0 fL    Platelets 217 140 - 450 10*3/mm3    Neutrophil % 73.0 42.7 - 76.0 %    Lymphocyte % 13.9 (L) 19.6 - 45.3 %    Monocyte % 9.7 5.0 - 12.0 %    Eosinophil % 2.5 0.3 - 6.2 %    Basophil % 0.5 0.0 - 1.5 %    Immature Grans % 0.4 0.0 - 0.5 %    Neutrophils, Absolute 5.34 1.70 - 7.00 10*3/mm3    Lymphocytes, Absolute 1.02 0.70 - 3.10 10*3/mm3    Monocytes, Absolute 0.71 0.10 - 0.90 10*3/mm3    Eosinophils, Absolute 0.18 0.00 - 0.40 10*3/mm3    Basophils, Absolute 0.04 0.00 - 0.20 10*3/mm3    Immature Grans, Absolute 0.03 0.00 - 0.05 10*3/mm3    nRBC 0.0 0.0 - 0.2 /100 WBC       If labs were ordered, I independently reviewed the results and considered them in treating the patient.        RADIOLOGY  CT Angio Abdominal Aorta Bilateral Iliofem Runoff  Result Date: 4/21/2025  PROCEDURE: CT ANGIO ABDOMINAL AORTA BILAT ILIOFEM RUNOFF-  CT ABDOMEN, CT PELVIS,  CTA ABDOMEN, CTA PELVIS AND CTA LOWER EXTREMITY RUNOFF  HISTORY: Peripheral vascular disease , left leg pain and swelling, hx of iliac stent  COMPARISON: CT abdomen and pelvis 06/10/2024.  TECHNIQUE: Thin section axial CT with IV contrast supplemented with multiplanar reconstruction under CT Angiogram protocol.   3-D reconstructions were performed.   FINDINGS:  CT ANGIOGRAM ABDOMEN AND PELVIS: There is no aortic aneurysm or dissection. The single bilateral nonstenotic renal arteries noted as well as patent celiac axis, SMA and GERSON.  CTA RIGHT LOWER EXTREMITY: There is no plaque from the right common femoral artery through the popliteal artery. The right anterior tibial artery is identified and appears normal and is patent to at least the mid foot. The right posterior tibial artery is not opacified until the right peroneal artery extends a  branch reconstituting the distal posterior tibial artery just above the level of the ankle. This is then patent to at least the mid foot.  CTA LEFT LOWER EXTREMITY: Left common iliac artery is widely patent as is the system to the left popliteal artery. As is seen in the right lower extremity there is no opacification of a left posterior tibial artery until a branch of the peroneal reconstitutes this distally. Anterior tibial and posterior tibial arteries are then patent to at least the left midfoot. It is noted that beginning in the pelvis there is infiltration of the fat around the left common iliac vein stent extending to left external iliac vein and into the region of the left common femoral vein. This continues into the left lower extremity. Patient also develops more subcutaneous edema in the left upper leg that becomes more diffuse at the level of the knee. Varicosities are identified in the left lower extremity and the subcutaneous edema extends to the dorsum of the foot.  Abdomen: Again noted is the 8 mm noncalcified nodule left lower lobe which is stable from 06/10/2024. No pleural effusion seen. Heart size is normal. Hepatosplenomegaly appears similar to prior. Liver is fatty infiltrated. Gallbladder present with no CT visible stones. Adrenals appear normal. There is mild dilatation of the pancreatic duct which appears increased from prior. No pancreatic head mass or distal stone is identified, recommend correlation with symptoms and laboratory values. IVC filter is in stable position compared to prior. Kidneys demonstrate bilateral circumscribed cysts, similar to prior.  Pelvis: Bilateral common/external iliac vein stents again noted. These appear stable from prior exam. No significant opacification identified since this was timed as an arterial study.      No significant peripheral vascular disease of the right lower extremity with two-vessel patency into the foot.  No significant peripheral vascular  disease of left lower extremity with two-vessel patency to the foot.  Infiltration of the fat around the venous system of the left lower extremity from the level of the left external iliac vein through the left popliteal vein correlating with deep venous thrombosis seen on ultrasound.  Bilateral common and external iliac venous stents.  Diffuse subcutaneous edema of the left lower leg and multiple varicosities; thrombosis of the left greater saphenous vein was demonstrated on ultrasound.  Mildly dilated pancreatic duct with no CT visible stone or mass, recommend correlation with laboratory values and any symptoms in this area.  This study was performed with techniques to keep radiation doses as low as reasonably achievable (ALARA). Individualized dose reduction techniques using automated exposure control or adjustment of vA and/or kV according to the patient size were employed.   This report was signed and finalized on 4/21/2025 2:06 PM by Criselda Dhaliwal MD.      US Venous Doppler Lower Extremity Left (duplex)  Result Date: 4/21/2025  LEFT LOWER EXTREMITY VENOUS DUPLEX DOPPLER EXAMINATION  HISTORY: Left Lower extremity swelling, worsening pain and swelling, known chronic DVT..  COMPARISON: May 2, 2022.  PROCEDURE: Multiple transverse and longitudinal scans were performed of the femoropopliteal deep venous system, with augmentation and compression maneuvers.  FINDINGS: Proper phasic flow was noted in the visualized deep venous system of the lower leg including the left popliteal, posterior tibial, anterior tibial and peroneal veins. . There is visible hypoechoic thrombus in the left common femoral and superficial femoral veins as well as the greater saphenous vein consistent with DVT. 2022 exam demonstrated diffuse DVT of the entire left lower extremity.      DVT involving the left common femoral vein, left superficial femoral vein and left greater saphenous vein.       This report was signed and finalized on 4/21/2025  1:09 PM by Criselda Dhaliwal MD.        I ordered and independently reviewed the above noted radiographic studies.      I viewed images of CT Angio Abdominal Aorta Bilateral Iliofem Runoff which showed established two-vessel blood flow to bilateral feet per my independent interpretation.    I viewed images of US Venous Doppler Lower Extremity Left which showed DVT positive per my independent interpretation.    See radiologist's dictation for official interpretation.        PROCEDURES    Procedures    No orders to display       MEDICATIONS GIVEN IN ER    Medications   sodium chloride 0.9 % flush 10 mL (has no administration in time range)   methylPREDNISolone sodium succinate (SOLU-Medrol) injection 40 mg (40 mg Intravenous Given 4/21/25 1530)   Pharmacy to dose enoxaparin (bridge until INR therapeutic) (has no administration in time range)   enoxaparin sodium (LOVENOX) syringe 90 mg (90 mg Subcutaneous Given 4/21/25 1508)   morphine injection 4 mg (4 mg Intravenous Given 4/21/25 1219)   ondansetron (ZOFRAN) injection 4 mg (4 mg Intravenous Given 4/21/25 1218)   diphenhydrAMINE (BENADRYL) injection 50 mg (50 mg Intravenous Given 4/21/25 1217)   iopamidol (ISOVUE-300) 61 % injection 100 mL (100 mL Intravenous Given 4/21/25 1259)   HYDROmorphone (DILAUDID) injection 1 mg (1 mg Intravenous Given 4/21/25 1427)   diphenhydrAMINE (BENADRYL) injection 25 mg (25 mg Intravenous Given 4/21/25 1508)   HYDROcodone-acetaminophen (NORCO) 5-325 MG per tablet 1 tablet (1 tablet Oral Given 4/21/25 1645)         MEDICAL DECISION MAKING, PROGRESS, and CONSULTS    All labs, if obtained, have been independently reviewed by me.  All radiology studies, if obtained, have been reviewed by me and the radiologist dictating the report.  All EKG's, if obtained, have been independently viewed and interpreted by me/my attending physician.      Discussion below represents my analysis of pertinent findings related to patient's condition, differential  diagnosis, treatment plan and final disposition.    Patient is a 39-year-old female with history of MTHFR mutation, chronic left leg DVT, and iliac and common femoral stents.  Presenting today for worsening of left leg swelling and pain since last Thursday.  The patient today is upright alert oriented complaining of significant pain in her left leg which is swollen and edematous, the left lower extremity exhibits dopplerable pulses in the PT and DP arteries.  Normal color to the lower extremity.  Given concern for acute arterial disease and/or DVT, a CTA aorta with runoffs was obtained along with a DVT ultrasound of the left leg.    Lab work today shows no leukocytosis, normal H&H, unremarkable chemistries and normal lactic acid lowering suspicion for acute arterial limb ischemia.  Patient is on warfarin and INR is obtained and found to be subtherapeutic at 1.32.  Patient was therefore given a Lovenox bridge.  DVT ultrasound was positive with DVTs present in multiple vessels.  Patient required extensive pain management due to her pain level, due to concern for DVT and subtherapeutic INR as well as her need for pain control, I have reached out to [facility] [place] spoke with Dr. Beltran who agreed to admit the patient to their facility.  Patient was transferred via POV at her request as she refused ambulance transport.  I feel she is stable for POV transport at this time but instructed patient not to eat or drink [facility]  Patient verbalized understanding of and agreement with this plan of care.                       Differential diagnosis:    Differential diagnosis included but was not limited to PAD, limb ischemia, DVT      Additional sources:    - Discussed/ obtained information from independent historians:  family member at bedside    - External (non-ED) record review:  Previous medical records reviewed    - Chronic or social conditions  impacting care:  PAD, DVT    Orders placed during this visit:  Orders Placed This Encounter   Procedures    COVID-19 and FLU A/B PCR, 1 HR TAT - Swab, Nasopharynx    CT Angio Abdominal Aorta Bilateral Iliofem Runoff    US Venous Doppler Lower Extremity Left (duplex)    Comprehensive Metabolic Panel    Protime-INR    aPTT    Lactic Acid, Plasma    C-reactive Protein    CBC Auto Differential    Discontinue Medications for Contrast Allergy Pre-Treatment Once Patient Arrives to Floor    Insert Peripheral IV    CBC & Differential         Additional orders considered but not ordered: None      ED Course:    Consultants: [facility] Dr. Beltran    ED Course as of 04/21/25 1650   Mon Apr 21, 2025   1507 Dr Barnard at [facility] in [place] agreed to accept patient for transfer [TG]      ED Course User Index  [TG] Alo Daly PA-C              Shared Decision Making:  After my consideration of clinical presentation and any laboratory/radiology studies obtained, I discussed the findings with the patient/patient representative who is in agreement with the treatment plan and the final disposition.   Risks and benefits of discharge and/or observation/admission were discussed.       AS OF 16:50 EDT VITALS:    BP - 121/61  HR - 95  TEMP - 98 °F (36.7 °C) (Oral)  O2 SATS - 100%                  DIAGNOSIS  Final diagnoses:   Chronic deep vein thrombosis (DVT) of proximal vein of left lower extremity   Left leg pain         DISPOSITION  Transfer to another facility-[facility]      Please note that portions of this document were completed with voice recognition software.      Alo Daly PA-C  04/21/25 1650

## 2025-04-21 NOTE — ED NOTES
At this time, I have contacted [facility] for KATERIN Caal. This facility is questioning the necessity of transfer. Call transferred back to KATERIN Caal.

## 2025-04-21 NOTE — ED NOTES
"[facility] called nursing staff to inform us that the [facility]. This RN went and told pt about the transferring process and the pt stated she would like to go [facility] to the [facility] with her family via POV. This RN informed charge nurse (Malina MCPHERSON) about the pt's decision to ride POV. Malina called the [facility] and informed them how the pt would arrive in which we were told \"per the [facility] policy, we do not accept any transfers via POV and her bed will be given away.\" Malina explained that the pt has the right to refuse an ambulance ride. Bibi then explained that the pt's transfer has been cancelled and she will need to drive to the [facility] emergency dept and go through the entire evaluation process once again. This RN, Malina, and Kendal (manager) all spoke with pt and her family about this process of transferring care. Pt and family explained they understood they would have to go through the emergency dept and redo all evaluations and are okay with that.  While printing out paperwork, Malina received a call from the VA stating they were mistaken and that they will indeed accept the pt by POV. This RN currently calling to give report to [facility].  "

## 2025-04-21 NOTE — ED NOTES
At this time, [facility] calling to let us know that this patient has a bed assigned, but it is not available at this time. VA states they will call back when the bed is available.

## 2025-05-06 ENCOUNTER — TELEPHONE (OUTPATIENT)
Dept: OBSTETRICS AND GYNECOLOGY | Facility: CLINIC | Age: 40
End: 2025-05-06

## 2025-05-06 NOTE — TELEPHONE ENCOUNTER
Hub staff attempted to follow warm transfer process and was unsuccessful     Caller: [facility]    Relationship to patient: [facility]     Best call back number: [phone]    Patient is needing: ELIZABETH CALLED TO RESCHEDULE PATIENT'S CANCELED ULTRASOUND AND NEW GYN FROM 05/01/25. PATIENT IS REQUESTING TO ESTABLISH WITH . HUB FIRST AVAILABLE WAS 07/22/25. UNABLE TO TRANSFER OR CONFIRM FIRST AVAILABLE WITH OFFICE. ELIZABETH REQUESTED TO SCHEDULE 07/22 AND CALL PATIENT IF SOONER DATE IS AVAILABLE.

## (undated) DEVICE — GLV SURG SENSICARE LT W/ALOE PF LF 7 STRL

## (undated) DEVICE — GW AMPLTZ SUPRSTF PTFE JB .035 7X145CM

## (undated) DEVICE — RICH CYSTO: Brand: MEDLINE INDUSTRIES, INC.

## (undated) DEVICE — NITINOL STONE RETRIEVAL BASKET: Brand: ZERO TIP

## (undated) DEVICE — SLV SCD CALF HEMOFORCE DVT THERP REPROC MD

## (undated) DEVICE — FIBR LASR FLEXIVAPULSEID/TRACTIP HOLMIUM BALL/TP 200MH 1P/U

## (undated) DEVICE — ADAPT UROLOK

## (undated) DEVICE — ST FLD IRR WARM

## (undated) DEVICE — DUAL LUMEN URETERAL CATHETER

## (undated) DEVICE — NITINOL WIRE WITH HYDROPHILIC TIP: Brand: SENSOR

## (undated) DEVICE — SOL IRR NACL 0.9PCT 3000ML

## (undated) DEVICE — URETERAL ACCESS SHEATH SET: Brand: NAVIGATOR HD

## (undated) DEVICE — CATHETER,URETHRAL,REDRUBBER,STRL,18FR: Brand: MEDLINE